# Patient Record
Sex: FEMALE | Race: WHITE | NOT HISPANIC OR LATINO | Employment: OTHER | URBAN - METROPOLITAN AREA
[De-identification: names, ages, dates, MRNs, and addresses within clinical notes are randomized per-mention and may not be internally consistent; named-entity substitution may affect disease eponyms.]

---

## 2017-01-26 RX ORDER — DUREZOL 0.5 MG/ML
EMULSION OPHTHALMIC
Qty: 5 ML | Refills: 3 | Status: SHIPPED | OUTPATIENT
Start: 2017-01-26 | End: 2017-09-20 | Stop reason: SDUPTHER

## 2017-04-19 ENCOUNTER — OFFICE VISIT (OUTPATIENT)
Dept: OPHTHALMOLOGY | Facility: CLINIC | Age: 82
End: 2017-04-19
Payer: MEDICARE

## 2017-04-19 DIAGNOSIS — Z94.7 STATUS POST CORNEAL TRANSPLANT: Primary | ICD-10-CM

## 2017-04-19 DIAGNOSIS — T86.8419 CORNEAL TRANSPLANT FAILURE: ICD-10-CM

## 2017-04-19 PROCEDURE — 92014 COMPRE OPH EXAM EST PT 1/>: CPT | Mod: S$PBB,,, | Performed by: OPHTHALMOLOGY

## 2017-04-19 PROCEDURE — 99213 OFFICE O/P EST LOW 20 MIN: CPT | Mod: PBBFAC | Performed by: OPHTHALMOLOGY

## 2017-04-19 PROCEDURE — 99999 PR PBB SHADOW E&M-EST. PATIENT-LVL III: CPT | Mod: PBBFAC,,, | Performed by: OPHTHALMOLOGY

## 2017-04-19 RX ORDER — LANOLIN ALCOHOL/MO/W.PET/CERES
1 CREAM (GRAM) TOPICAL DAILY
Refills: 3 | COMMUNITY
Start: 2017-02-14 | End: 2018-08-16

## 2017-04-19 RX ORDER — CLOBETASOL PROPIONATE 0.5 MG/G
CREAM TOPICAL
Refills: 2 | COMMUNITY
Start: 2017-03-02 | End: 2018-08-16 | Stop reason: SDUPTHER

## 2017-04-19 RX ORDER — CLOBETASOL PROPIONATE 0.5 MG/G
OINTMENT TOPICAL
Refills: 2 | COMMUNITY
Start: 2017-03-09

## 2017-04-19 RX ORDER — HALOBETASOL PROPIONATE 0.5 MG/G
CREAM TOPICAL
Refills: 1 | COMMUNITY
Start: 2017-04-04 | End: 2017-04-19

## 2017-04-19 RX ORDER — CLOPIDOGREL BISULFATE 75 MG/1
75 TABLET ORAL DAILY
Refills: 3 | COMMUNITY
Start: 2017-03-23

## 2017-04-19 RX ORDER — ATORVASTATIN CALCIUM 80 MG/1
80 TABLET, FILM COATED ORAL NIGHTLY
Refills: 3 | COMMUNITY
Start: 2017-03-23 | End: 2020-05-20 | Stop reason: SDUPTHER

## 2017-04-19 RX ORDER — SUCRALFATE 1 G/1
TABLET ORAL
Refills: 3 | COMMUNITY
Start: 2017-03-09 | End: 2018-08-16 | Stop reason: CLARIF

## 2017-04-19 RX ORDER — PANTOPRAZOLE SODIUM 40 MG/1
40 TABLET, DELAYED RELEASE ORAL DAILY
Refills: 3 | COMMUNITY
Start: 2017-02-23

## 2017-04-19 NOTE — PROGRESS NOTES
HPI     Eye Problem    Additional comments: S/p PKP           Comments   Hx of failing graft OS.   S/p PKP 03/10/04 by Dr Low.   S/p RK OU by Dr Tubbs.    Pt states vision fluctuates but seems to be good today.  Over the last two   weeks has had several episodes of white spot in central vision OS.  Takes   several hours to clear.  Also feels that the ALYSA is lower now.  States   family is noticing it too.        Durezol bid OS   Alway qhs OD          Last edited by Maribel August on 4/19/2017  1:12 PM. (History)            Assessment /Plan     For exam results, see Encounter Report.    Status post corneal transplant    Corneal transplant failure      RK OD with good vision.    PKP OS with chronic graft failure.  Vision from 20/60 to 20/200. No pain.  Considering repeat PKP OS, but monitor for now.

## 2017-04-19 NOTE — MR AVS SNAPSHOT
[facility]  [address]  [facility] [address]  Phone: [phone]  Fax: [phone]                  Jodie Alvarez   2017 1:15 PM   Office Visit    Description:  Female : 1933   Provider:  Jose Lerma MD   Department:  [facility]           Reason for Visit     Eye Problem           Diagnoses this Visit        Comments    Status post corneal transplant    -  Primary     Corneal transplant failure                To Do List           Goals (5 Years of Data)     None      [facility] On [facility]     [facility] On Call Nurse Care Line -  Assistance  Unless otherwise directed by your provider, please contact [facility], our nurse care line that is available for  assistance.     Registered nurses in the [facility] On Call Center provide: appointment scheduling, clinical advisement, health education, and other advisory services.  Call: [phone] (toll free)               Medications           Message regarding Medications     Verify the changes and/or additions to your medication regime listed below are the same as discussed with your clinician today.  If any of these changes or additions are incorrect, please notify your healthcare provider.        STOP taking these medications     butalbital-aspirin-caffeine -40 mg (FIORINAL) -40 mg Cap Take 1 capsule by mouth every 4 (four) hours as needed.    CIPROFLOXACIN/CIPROFLOXA HCL (CIPROFLOXACIN ER ORAL) Take by mouth.    halobetasol (ULTRAVATE) 0.05 % cream APPLY TO THE AFFECTED AREA(S) TWICE DAILY           Verify that the below list of medications is an accurate representation of the medications you are currently taking.  If none reported, the list may be blank. If incorrect, please contact your healthcare provider. Carry this list with you in case of emergency.           Current Medications     atorvastatin (LIPITOR) 80 MG tablet Take 80 mg by mouth nightly.    clobetasol (TEMOVATE) 0.05 % cream APPLY  TO THE AFFECTED AREA(S) LEGS TWICE DAILY wear knee high support hose    clobetasol 0.05% (TEMOVATE) 0.05 % Oint APPLY TO THE AFFECTED AREA(S) LEGS TWICE DAILY wear knee high support hose    clopidogrel (PLAVIX) 75 mg tablet Take 75 mg by mouth once daily.    DUREZOL 0.05 % Drop ophthalmic solution PLACE TWO DROPS INTO LEFT EYE TWICE DAILY    fenofibrate 160 MG Tab Take 160 mg by mouth once daily.    magnesium oxide (MAG-OX) 400 mg tablet Take 1 tablet by mouth once daily.    metoprolol tartrate (LOPRESSOR) 50 MG tablet Take 50 mg by mouth 2 (two) times daily.    moexipril-hydrochlorothiazide (UNIRETIC) 7.5-12.5 mg per tablet Take 1 tablet by mouth once daily.    pantoprazole (PROTONIX) 40 MG tablet Take 40 mg by mouth once daily.    tramadol (ULTRAM) 50 mg tablet Take 50 mg by mouth every 6 (six) hours as needed for Pain.    UNABLE TO FIND Tranzene    sucralfate (CARAFATE) 1 gram tablet Take 1 tablet by mouth four times a day TAKE 1 TABLET BY MOUTH FOUR TIMES A DAY TAKE BEFORE MEALS AND AT BEDTIME           Clinical Reference Information           Allergies as of 4/19/2017     No Known Allergies      Immunizations Administered on Date of Encounter - 4/19/2017     None      [facility] Sign-Up     Activating your [facility] account is as easy as 1-2-3!     1) Visit [email or web address], select Sign Up Now, enter this activation code and your date of birth, then select Next.  [email or web address]  Expires: 6/3/2017  2:02 PM      2) Create a username and password to use when you visit [facility] in the future and select a security question in case you lose your password and select Next.    3) Enter your e-mail address and click Sign Up!    Additional Information  If you have questions, please e-mail [email or web address] or call [phone] to talk to our [facility] staff. Remember, [facility] is NOT to be used for urgent needs. For medical emergencies, dial 911.         Language Assistance Services     ATTENTION: Language  assistance services are available, free of charge. Please call [phone].      ATENCIÓN: Si habla bharat, tiene a mckeon disposición servicios gratuitos de asistencia lingüística. Llame al [phone].     CHÚ Ý: N?u b?n nói Ti?ng Vi?t, có các d?ch v? h? tr? ngôn ng? mi?n phí dành cho b?n. G?i s? [phone].         [facility] complies with applicable Federal civil rights laws and does not discriminate on the basis of race, color, national origin, age, disability, or sex.

## 2017-09-27 RX ORDER — DUREZOL 0.5 MG/ML
EMULSION OPHTHALMIC
Qty: 5 ML | Refills: 3 | Status: SHIPPED | OUTPATIENT
Start: 2017-09-27 | End: 2019-01-09 | Stop reason: SDUPTHER

## 2018-04-25 ENCOUNTER — OFFICE VISIT (OUTPATIENT)
Dept: OPHTHALMOLOGY | Facility: CLINIC | Age: 83
End: 2018-04-25
Payer: MEDICARE

## 2018-04-25 DIAGNOSIS — T86.8419 CORNEAL TRANSPLANT FAILURE: Primary | ICD-10-CM

## 2018-04-25 DIAGNOSIS — Z98.890 HISTORY OF RADIAL KERATOTOMY: ICD-10-CM

## 2018-04-25 PROCEDURE — 99212 OFFICE O/P EST SF 10 MIN: CPT | Mod: PBBFAC | Performed by: OPHTHALMOLOGY

## 2018-04-25 PROCEDURE — 99999 PR PBB SHADOW E&M-EST. PATIENT-LVL II: CPT | Mod: PBBFAC,,, | Performed by: OPHTHALMOLOGY

## 2018-04-25 PROCEDURE — 92014 COMPRE OPH EXAM EST PT 1/>: CPT | Mod: S$PBB,,, | Performed by: OPHTHALMOLOGY

## 2018-04-25 RX ORDER — NITROGLYCERIN 20 MG/1
PATCH TRANSDERMAL
Refills: 3 | COMMUNITY
Start: 2018-02-05 | End: 2019-12-18 | Stop reason: DRUGHIGH

## 2018-04-25 RX ORDER — POTASSIUM CHLORIDE 750 MG/1
10 CAPSULE, EXTENDED RELEASE ORAL DAILY
Refills: 0 | COMMUNITY
Start: 2018-02-15

## 2018-04-25 RX ORDER — PREDNISOLONE ACETATE 10 MG/ML
1 SUSPENSION/ DROPS OPHTHALMIC 2 TIMES DAILY
Qty: 10 ML | Refills: 6 | Status: SHIPPED | OUTPATIENT
Start: 2018-04-25 | End: 2019-11-07

## 2018-04-25 NOTE — PROGRESS NOTES
HPI     Follow-up    Additional comments: 1 YEAR FU            Comments   H/O of failing graft OS.   S/P PKP 03/10/04 by Dr Low.   S/P RK OU by Dr Tubbs.    Pt states she is doing well today, she states her vision has been   fluctuating a lot within the past few weeks and was wondering if it could   be due to blood pressure.   Durezol BID OS   Alway QHS OD        Last edited by Yin Boyle, PCT on 4/25/2018  1:20 PM. (History)            Assessment /Plan     For exam results, see Encounter Report.    Corneal transplant failure    History of radial keratotomy      PKP failure with edema OS, stable without complications.  OD RK with good vision.

## 2018-08-20 PROBLEM — S42.301K: Status: ACTIVE | Noted: 2018-08-20

## 2018-08-21 PROBLEM — E87.6 HYPOKALEMIA: Status: ACTIVE | Noted: 2018-08-21

## 2018-08-21 PROBLEM — I10 ESSENTIAL HYPERTENSION: Status: ACTIVE | Noted: 2018-08-21

## 2018-08-21 PROBLEM — S42.301A FRACTURE OF HUMERAL SHAFT, RIGHT, CLOSED: Status: ACTIVE | Noted: 2018-08-21

## 2018-08-21 PROBLEM — I25.10 CAD (CORONARY ARTERY DISEASE): Status: ACTIVE | Noted: 2018-08-21

## 2019-02-28 RX ORDER — DUREZOL 0.5 MG/ML
EMULSION OPHTHALMIC
Qty: 5 ML | Refills: 3 | Status: SHIPPED | OUTPATIENT
Start: 2019-02-28 | End: 2020-01-24

## 2019-03-06 ENCOUNTER — TELEPHONE (OUTPATIENT)
Dept: OPHTHALMOLOGY | Facility: CLINIC | Age: 84
End: 2019-03-06

## 2019-03-06 NOTE — TELEPHONE ENCOUNTER
----- Message from Isabel Jim sent at 3/6/2019 10:21 AM CST -----  Contact: Jodie Mendez calling to find out why she is not to be scheduled with Dr. Lerma.  The recall had Dr. Lynn on it and she does not know why.  If she has to just come in for an exam she states she can have that done elsewhere with her own eye doctor.  Please confirm with patient and if we need to cancel the appt please do so.  She can be reached at [phone]

## 2019-03-06 NOTE — TELEPHONE ENCOUNTER
Patient states she wants to cx her appt with Dr. Lynn. She will see Dr. Patel, who is the doctor that referred her to Dr. Lerma.

## 2019-05-22 ENCOUNTER — OFFICE VISIT (OUTPATIENT)
Dept: CARDIOLOGY | Facility: CLINIC | Age: 84
End: 2019-05-22
Payer: MEDICARE

## 2019-05-22 VITALS
HEIGHT: 66 IN | HEART RATE: 58 BPM | BODY MASS INDEX: 23.1 KG/M2 | OXYGEN SATURATION: 92 % | DIASTOLIC BLOOD PRESSURE: 82 MMHG | WEIGHT: 143.75 LBS | SYSTOLIC BLOOD PRESSURE: 128 MMHG

## 2019-05-22 DIAGNOSIS — Z79.899 LONG TERM CURRENT USE OF AMIODARONE: ICD-10-CM

## 2019-05-22 DIAGNOSIS — E78.5 DYSLIPIDEMIA: ICD-10-CM

## 2019-05-22 DIAGNOSIS — Z79.02 LONG TERM (CURRENT) USE OF ANTITHROMBOTICS/ANTIPLATELETS: ICD-10-CM

## 2019-05-22 DIAGNOSIS — R01.1 UNDIAGNOSED CARDIAC MURMURS: ICD-10-CM

## 2019-05-22 DIAGNOSIS — I10 ESSENTIAL HYPERTENSION: ICD-10-CM

## 2019-05-22 DIAGNOSIS — I73.9 ASYMPTOMATIC PVD (PERIPHERAL VASCULAR DISEASE): ICD-10-CM

## 2019-05-22 DIAGNOSIS — I25.709 CORONARY ARTERY DISEASE INVOLVING CORONARY BYPASS GRAFT OF NATIVE HEART WITH ANGINA PECTORIS: Primary | ICD-10-CM

## 2019-05-22 DIAGNOSIS — N18.30 STAGE 3 CHRONIC KIDNEY DISEASE: ICD-10-CM

## 2019-05-22 DIAGNOSIS — R09.89 BILATERAL CAROTID BRUITS: ICD-10-CM

## 2019-05-22 PROCEDURE — 99204 OFFICE O/P NEW MOD 45 MIN: CPT | Mod: S$GLB,,, | Performed by: INTERNAL MEDICINE

## 2019-05-22 PROCEDURE — 93000 ELECTROCARDIOGRAM COMPLETE: CPT | Mod: S$GLB,,, | Performed by: INTERNAL MEDICINE

## 2019-05-22 PROCEDURE — 93000 EKG 12-LEAD: ICD-10-PCS | Mod: S$GLB,,, | Performed by: INTERNAL MEDICINE

## 2019-05-22 PROCEDURE — 99204 PR OFFICE/OUTPT VISIT, NEW, LEVL IV, 45-59 MIN: ICD-10-PCS | Mod: S$GLB,,, | Performed by: INTERNAL MEDICINE

## 2019-05-22 RX ORDER — AMIODARONE HYDROCHLORIDE 100 MG/1
100 TABLET ORAL DAILY
Qty: 30 TABLET | Refills: 1 | Status: SHIPPED | OUTPATIENT
Start: 2019-05-22 | End: 2019-07-05 | Stop reason: SDUPTHER

## 2019-05-22 RX ORDER — RANOLAZINE 1000 MG/1
1000 TABLET, EXTENDED RELEASE ORAL 2 TIMES DAILY
Refills: 2 | COMMUNITY
Start: 2019-04-30 | End: 2019-07-18 | Stop reason: SDUPTHER

## 2019-05-22 RX ORDER — AMIODARONE HYDROCHLORIDE 200 MG/1
200 TABLET ORAL DAILY
Refills: 2 | COMMUNITY
Start: 2019-04-30 | End: 2019-05-22 | Stop reason: DRUGHIGH

## 2019-05-22 RX ORDER — NITROGLYCERIN 0.4 MG/1
0.4 TABLET SUBLINGUAL EVERY 5 MIN PRN
Qty: 25 TABLET | Refills: 0 | Status: SHIPPED | OUTPATIENT
Start: 2019-05-22 | End: 2020-05-21

## 2019-05-22 NOTE — PROGRESS NOTES
Subjective:    Patient ID:  Jodie Alvarez is a 86 y.o. female who presents for Coronary Artery Disease; Hypertension; and Hyperlipidemia        Coronary Artery Disease   Presents for initial visit. The disease course has been fluctuating. Pertinent negatives include no chest pain (SOME TIGHTNESS, ), dizziness (OCC), leg swelling, palpitations (LESS) or shortness of breath (SOME). Risk factors include hyperlipidemia. Past treatments include antiplatelet agents, beta blockers and nitrates. The treatment provided moderate relief. Compliance with prior treatments has been good. Her past medical history is significant for angina pectoris, arrhythmia and valvular heart disease. Past surgical history includes angioplasty, CABG and cardiac stents.   Hypertension   This is a chronic problem. The current episode started more than 1 year ago. The problem is controlled. Pertinent negatives include no blurred vision, chest pain (SOME TIGHTNESS, ), malaise/fatigue (SOME), orthopnea, palpitations (LESS), PND or shortness of breath (SOME). Past treatments include beta blockers. The current treatment provides moderate improvement. Compliance problems include exercise.  There is no history of CVA or heart failure.   Hyperlipidemia   This is a chronic problem. The current episode started more than 1 year ago. Pertinent negatives include no chest pain (SOME TIGHTNESS, ), focal weakness or shortness of breath (SOME). Current antihyperlipidemic treatment includes statins. Risk factors for coronary artery disease include dyslipidemia and hypertension.     NEW PATIENT EVALUATION, WAS FOLLOWED BY DR FLORES, , HAD PCI COMPLICATED BY PERFORATION,ON 4/26/2019 AT [facility],WAS TO HAVE CABG, THEN MEDICAL TX, AS SEEN BY DR CURTIS, ALSO SEEN BY DR SHEIKH,  ,  HAS NOT BEEN WELL SINCE, , HAD R CEA PER DR CURTIS 1/28/2019 AT [facility],ON AMIODARONE  FOR ?? PAF, ALSO ON RANEXA, HAD CKD,RECENT CR 1.51. GFR 33 ,HAD ILIAC STENT ?/. DR SEGUNDO,  SEE ROS ,     Past  Medical History:   Diagnosis Date    Anticoagulant long-term use     Arthritis     Carotid artery occlusion     Cataract     Coronary artery disease     Hyperlipidemia     Hypertension      Past Surgical History:   Procedure Laterality Date    ANGIOPLASTY      DR SEGUNDO    APPENDECTOMY  1949    CARDIAC CATHETERIZATION      Carotid Surgery Left 1982    CATARACT EXTRACTION      COLONOSCOPY      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    Dr Low    CORONARY ARTERY BYPASS GRAFT  05/01/2006    HYSTERECTOMY  1972    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    ORIF, FRACTURE, HUMERUS - NONUNION Right 8/21/2018    Performed by [facility]wan [facility] at [facility]    [facility]  08/14/2008    3    RK/AK Bilateral 1990    Dr Tubbs    stent aearea  04/26/2016    TONSILLECTOMY  1939    VASCULAR SURGERY       Family History   Problem Relation Age of Onset    Amblyopia Neg Hx     Blindness Neg Hx     Cataracts Neg Hx     Glaucoma Neg Hx     Macular degeneration Neg Hx     Retinal detachment Neg Hx     Strabismus Neg Hx      Social History     Socioeconomic History    Marital status:      Spouse name: Not on file    Number of children: Not on file    Years of education: Not on file    Highest education level: Not on file   Occupational History    Not on file   Social Needs    Financial resource strain: Not on file    Food insecurity:     Worry: Not on file     Inability: Not on file    Transportation needs:     Medical: Not on file     Non-medical: Not on file   Tobacco Use    Smoking status: Never Smoker    Smokeless tobacco: Never Used   Substance and Sexual Activity    Alcohol use: No    Drug use: Never    Sexual activity: Not on file   Lifestyle    Physical activity:     Days per week: Not on file     Minutes per session: Not on file    Stress: Not on file   Relationships    Social connections:     Talks on phone: Not on file     Gets together: Not on file     Attends Temple service: Not on file      Active member of club or organization: Not on file     Attends meetings of clubs or organizations: Not on file     Relationship status: Not on file   Other Topics Concern    Not on file   Social History Narrative    Not on file       Review of patient's allergies indicates:  No Known Allergies    Current Outpatient Medications:     aspirin 325 MG tablet, Take 325 mg by mouth once daily. , Disp: , Rfl:     atorvastatin (LIPITOR) 80 MG tablet, Take 80 mg by mouth nightly., Disp: , Rfl: 3    clopidogrel (PLAVIX) 75 mg tablet, Take 75 mg by mouth once daily., Disp: , Rfl: 3    clorazepate (TRANXENE) 7.5 MG Tab, Take 7.5 mg by mouth 3 (three) times daily., Disp: , Rfl:     coenzyme Q10 (CO Q-10) 100 mg capsule, Take 100 mg by mouth every evening., Disp: , Rfl:     DUREZOL 0.05 % Drop ophthalmic solution, PLACE TWO DROPS INTO LEFT EYE TWICE DAILY, Disp: 5 mL, Rfl: 3    fenofibrate 160 MG Tab, Take 160 mg by mouth once daily., Disp: , Rfl:     metoprolol tartrate (LOPRESSOR) 100 MG tablet, Take 100 mg by mouth 2 (two) times daily. , Disp: , Rfl:     nitroGLYCERIN 0.1 mg/hr TD PT24 (NITRODUR) 0.1 mg/hr PT24, apply ONE PATCH TO SKIN ONCE a DAY AS DIRECTED, Disp: , Rfl: 3    omega-3 fatty acids-fish oil 300-500 mg Cap, Take 1 capsule by mouth once daily., Disp: , Rfl:     oxyCODONE-acetaminophen (PERCOCET)  mg per tablet, Take 1 tablet by mouth every 4 (four) hours as needed for Pain., Disp: 45 tablet, Rfl: 0    pantoprazole (PROTONIX) 40 MG tablet, Take 40 mg by mouth once daily. , Disp: , Rfl: 3    potassium chloride (MICRO-K) 10 MEQ CpSR, Take 10 mEq by mouth once daily., Disp: , Rfl: 0    ranolazine (RANEXA) 1,000 mg Tb12, Take 1,000 mg by mouth 2 (two) times daily., Disp: , Rfl: 2    tramadol (ULTRAM) 50 mg tablet, Take 50 mg by mouth every 8 (eight) hours as needed for Pain. , Disp: , Rfl:     amiodarone (PACERONE) 100 MG Tab, Take 1 tablet (100 mg total) by mouth once daily., Disp: 30  tablet, Rfl: 1    clobetasol 0.05% (TEMOVATE) 0.05 % Oint, APPLY TO THE AFFECTED AREA(S) LEGS TWICE DAILY wear knee high support hose, Disp: , Rfl: 2    nitroGLYCERIN (NITROSTAT) 0.4 MG SL tablet, Place 1 tablet (0.4 mg total) under the tongue every 5 (five) minutes as needed., Disp: 25 tablet, Rfl: 0    prednisoLONE acetate (PRED FORTE) 1 % DrpS, Place 1 drop into the left eye 2 (two) times daily., Disp: 10 mL, Rfl: 6  No current facility-administered medications for this visit.     Facility-Administered Medications Ordered in Other Visits:     sodium chloride 0.9% flush 3 mL, 3 mL, Intravenous, PRN, Donato Awan MD    Review of Systems   Constitution: Negative for chills, diaphoresis, fever, malaise/fatigue (SOME) and night sweats.   HENT: Negative for congestion, hearing loss and nosebleeds.    Eyes: Negative for blurred vision and visual disturbance.   Cardiovascular: Positive for dyspnea on exertion. Negative for chest pain (SOME TIGHTNESS, ), claudication, cyanosis, irregular heartbeat, leg swelling, near-syncope, orthopnea, palpitations (LESS), paroxysmal nocturnal dyspnea and syncope.   Respiratory: Negative for cough, hemoptysis, shortness of breath (SOME) and wheezing.    Endocrine: Negative for cold intolerance, heat intolerance and polyuria.   Hematologic/Lymphatic: Negative for adenopathy. Does not bruise/bleed easily.   Skin: Negative for color change, itching and rash.   Musculoskeletal: Negative for back pain (SOME), falls and stiffness.   Gastrointestinal: Negative for abdominal pain, change in bowel habit, dysphagia, heartburn, hematemesis, jaundice, melena and vomiting.   Genitourinary: Negative for dysuria, flank pain and frequency. Bladder incontinence: SOME.   Neurological: Negative for brief paralysis, dizziness (OCC), focal weakness, light-headedness, loss of balance, numbness, paresthesias, sensory change, tremors and weakness.   Psychiatric/Behavioral: Negative for altered mental status,  "depression and memory loss. The patient is not nervous/anxious.    Allergic/Immunologic: Negative for hives and persistent infections.        Objective:      Vitals:    05/22/19 1123   BP: 128/82   Pulse: (!) 58   SpO2: (!) 92%   Weight: 65.2 kg (143 lb 11.8 oz)   Height: 5' 6" (1.676 m)   PainSc: 0-No pain     Body mass index is 23.2 kg/m².    Physical Exam   Constitutional: She is oriented to person, place, and time. She appears well-developed and well-nourished. She is active.   HENT:   Head: Normocephalic and atraumatic.   Mouth/Throat: Oropharynx is clear and moist and mucous membranes are normal.   Eyes: Pupils are equal, round, and reactive to light. Conjunctivae and EOM are normal.   Neck: Trachea normal and normal range of motion. Neck supple. Normal carotid pulses, no hepatojugular reflux and no JVD present. Carotid bruit is present. No tracheal deviation, no edema and no erythema present. No thyromegaly present.       R CEA SACR   Cardiovascular: Normal rate and regular rhythm.  No extrasystoles are present. PMI is not displaced. Exam reveals no gallop and no friction rub.   Murmur heard.  High-pitched blowing systolic murmur is present with a grade of 2/6 at the apex.  Pulses:       Carotid pulses are 2+ on the right side with bruit, and 2+ on the left side with bruit.       Radial pulses are 2+ on the right side, and 2+ on the left side.        Femoral pulses are 2+ on the right side with bruit, and 2+ on the left side with bruit.       Dorsalis pedis pulses are 2+ on the right side, and 2+ on the left side.        Posterior tibial pulses are 2+ on the right side, and 2+ on the left side.   Pulmonary/Chest: Effort normal and breath sounds normal. No tachypnea and no bradypnea. She has no wheezes. She has no rales. She exhibits no tenderness.   STERNOTOMY SCAR   Abdominal: Soft. Bowel sounds are normal. She exhibits no distension and no mass. There is no hepatosplenomegaly. There is no tenderness. There " is no guarding and no CVA tenderness.   Musculoskeletal: Normal range of motion. She exhibits no edema or tenderness.   FELICITY R ARM   Lymphadenopathy:     She has no cervical adenopathy.   Neurological: She is alert and oriented to person, place, and time. She has normal strength. She displays no tremor. No cranial nerve deficit. She exhibits normal muscle tone. Coordination normal.   Skin: Skin is warm and dry. No rash noted. No cyanosis or erythema. No pallor.   Psychiatric: She has a normal mood and affect. Her speech is normal and behavior is normal. Judgment and thought content normal.               ..    Chemistry        Component Value Date/Time     08/22/2018 0930    K 3.5 08/22/2018 0930     08/22/2018 0930    CO2 29 08/22/2018 0930    BUN 16 08/22/2018 0930    CREATININE 0.91 08/22/2018 0930     (H) 08/22/2018 0930        Component Value Date/Time    CALCIUM 8.3 (L) 08/22/2018 0930    ESTGFRAFRICA >60 08/22/2018 0930    EGFRNONAA 58 (A) 08/22/2018 0930            ..No results found for: CHOL  No results found for: HDL  No results found for: LDLCALC  No results found for: TRIG  No results found for: CHOLHDL  ..  Lab Results   Component Value Date    WBC 6.05 08/22/2018    HGB 10.3 (L) 08/22/2018    HCT 32.2 (L) 08/22/2018    MCV 91 08/22/2018     08/22/2018       Test(s) Reviewed  I have reviewed the following in detail:  [] Stress test   [] Angiography   [] Echocardiogram   [] Labs   [] Other:       Assessment:         ICD-10-CM ICD-9-CM   1. Coronary artery disease involving coronary bypass graft of native heart with angina pectoris I25.709 414.05     413.9   2. Long term current use of amiodarone Z79.899 V58.69   3. Bilateral carotid bruits R09.89 785.9   4. Essential hypertension I10 401.9   5. Dyslipidemia E78.5 272.4   6. Stage 3 chronic kidney disease N18.3 585.3   7. Asymptomatic PVD (peripheral vascular disease) I73.9 443.9   8. Long term (current) use of  antithrombotics/antiplatelets Z79.02 V58.63   9. Undiagnosed cardiac murmurs R01.1 785.2     Problem List Items Addressed This Visit        Cardiac/Vascular    CAD (coronary artery disease) - Primary    Relevant Orders    SCHEDULED EKG 12-LEAD (to Muse) (Completed)    Comprehensive metabolic panel    Transthoracic echo (TTE) 2D with Color Flow    Essential hypertension    Relevant Orders    Comprehensive metabolic panel    Long term current use of amiodarone    Relevant Orders    Comprehensive metabolic panel    TSH    T3    T4    Dyslipidemia    Relevant Orders    Comprehensive metabolic panel    Lipid panel    Bilateral carotid bruits    Relevant Orders    Comprehensive metabolic panel    CV Ultrasound Bilateral Doppler Carotid    Asymptomatic PVD (peripheral vascular disease)    Relevant Orders    Comprehensive metabolic panel       Renal/    Stage 3 chronic kidney disease    Relevant Orders    Comprehensive metabolic panel       Hematology    Long term (current) use of antithrombotics/antiplatelets    Relevant Orders    Comprehensive metabolic panel    Hemoglobin      Other Visit Diagnoses     Undiagnosed cardiac murmurs        Relevant Orders    Transthoracic echo (TTE) 2D with Color Flow           Plan:     EKG SB , FIRST DEGREE AVB, , WILL GET RECORDS FROM [facility], DECREASE AMIODARONE  MG, QUESTIONABLY HAD PAROXYSMAL VT, CGECK ECHO, CAROTIDS, LABS,ALL OTHER CV CLINICALLY STABLE, CLASS 1-2 ANGINA, NO HF, NO TIA, NO CLINICAL ARRHYTHMIA,CONTINUE CURRENT MEDS, EDUCATION, DIET, EXERCISE , EXPLAINED PLAN TO PATIENT AND HER FRIEND, DID SEE THE PATIENT BACK IN FEW WEEKS ALSO TO OBTAIN CD OF THE ACTUAL ANGIOGRAM TO REVIEW      Coronary artery disease involving coronary bypass graft of native heart with angina pectoris  -     SCHEDULED EKG 12-LEAD (to Muse); Future  -     Comprehensive metabolic panel; Future; Expected date: 05/22/2019  -     Transthoracic echo (TTE) 2D with Color Flow; Future    Long term current  use of amiodarone  -     Comprehensive metabolic panel; Future; Expected date: 05/22/2019  -     TSH; Future; Expected date: 05/22/2019  -     T3; Future; Expected date: 05/22/2019  -     T4; Future; Expected date: 05/22/2019    Bilateral carotid bruits  -     Comprehensive metabolic panel; Future; Expected date: 05/22/2019  -     CV Ultrasound Bilateral Doppler Carotid; Future    Essential hypertension  -     Comprehensive metabolic panel; Future; Expected date: 05/22/2019    Dyslipidemia  -     Comprehensive metabolic panel; Future; Expected date: 05/22/2019  -     Lipid panel; Future; Expected date: 05/22/2019    Stage 3 chronic kidney disease  -     Comprehensive metabolic panel; Future; Expected date: 05/22/2019    Asymptomatic PVD (peripheral vascular disease)  -     Comprehensive metabolic panel; Future; Expected date: 05/22/2019    Long term (current) use of antithrombotics/antiplatelets  -     Comprehensive metabolic panel; Future; Expected date: 05/22/2019  -     Hemoglobin; Future; Expected date: 05/22/2019    Undiagnosed cardiac murmurs  -     Transthoracic echo (TTE) 2D with Color Flow; Future    Other orders  -     amiodarone (PACERONE) 100 MG Tab; Take 1 tablet (100 mg total) by mouth once daily.  Dispense: 30 tablet; Refill: 1  -     nitroGLYCERIN (NITROSTAT) 0.4 MG SL tablet; Place 1 tablet (0.4 mg total) under the tongue every 5 (five) minutes as needed.  Dispense: 25 tablet; Refill: 0    RTC Low level/low impact aerobic exercise 5x's/wk. Heart healthy diet and risk factor modification.    See labs and med orders.    Aerobic exercise 5x's/wk. Heart healthy diet and risk factor modification.    See labs and med orders.

## 2019-06-06 ENCOUNTER — CLINICAL SUPPORT (OUTPATIENT)
Dept: CARDIOLOGY | Facility: CLINIC | Age: 84
End: 2019-06-06
Attending: INTERNAL MEDICINE
Payer: MEDICARE

## 2019-06-06 ENCOUNTER — LAB VISIT (OUTPATIENT)
Dept: LAB | Facility: HOSPITAL | Age: 84
End: 2019-06-06
Attending: INTERNAL MEDICINE
Payer: MEDICARE

## 2019-06-06 VITALS
BODY MASS INDEX: 22.98 KG/M2 | SYSTOLIC BLOOD PRESSURE: 128 MMHG | WEIGHT: 143 LBS | HEART RATE: 58 BPM | HEIGHT: 66 IN | DIASTOLIC BLOOD PRESSURE: 82 MMHG

## 2019-06-06 DIAGNOSIS — N18.30 STAGE 3 CHRONIC KIDNEY DISEASE: ICD-10-CM

## 2019-06-06 DIAGNOSIS — Z79.899 LONG TERM CURRENT USE OF AMIODARONE: ICD-10-CM

## 2019-06-06 DIAGNOSIS — I10 ESSENTIAL HYPERTENSION: ICD-10-CM

## 2019-06-06 DIAGNOSIS — R01.1 UNDIAGNOSED CARDIAC MURMURS: ICD-10-CM

## 2019-06-06 DIAGNOSIS — R09.89 BILATERAL CAROTID BRUITS: ICD-10-CM

## 2019-06-06 DIAGNOSIS — I73.9 ASYMPTOMATIC PVD (PERIPHERAL VASCULAR DISEASE): ICD-10-CM

## 2019-06-06 DIAGNOSIS — Z79.02 LONG TERM (CURRENT) USE OF ANTITHROMBOTICS/ANTIPLATELETS: ICD-10-CM

## 2019-06-06 DIAGNOSIS — I25.709 CORONARY ARTERY DISEASE INVOLVING CORONARY BYPASS GRAFT OF NATIVE HEART WITH ANGINA PECTORIS: ICD-10-CM

## 2019-06-06 DIAGNOSIS — E78.5 DYSLIPIDEMIA: ICD-10-CM

## 2019-06-06 LAB
ALBUMIN SERPL BCP-MCNC: 3.2 G/DL (ref 3.5–5.2)
ALP SERPL-CCNC: 71 U/L (ref 55–135)
ALT SERPL W/O P-5'-P-CCNC: 19 U/L (ref 10–44)
ANION GAP SERPL CALC-SCNC: 6 MMOL/L (ref 8–16)
ASCENDING AORTA: 3.22 CM
AST SERPL-CCNC: 31 U/L (ref 10–40)
AV INDEX (PROSTH): 0.68
AV MEAN GRADIENT: 5.9 MMHG
AV PEAK GRADIENT: 12.39 MMHG
AV VALVE AREA: 2.33 CM2
AV VELOCITY RATIO: 0.54
BILIRUB SERPL-MCNC: 0.4 MG/DL (ref 0.1–1)
BSA FOR ECHO PROCEDURE: 1.74 M2
BUN SERPL-MCNC: 19 MG/DL (ref 8–23)
CALCIUM SERPL-MCNC: 9.8 MG/DL (ref 8.7–10.5)
CHLORIDE SERPL-SCNC: 109 MMOL/L (ref 95–110)
CHOLEST SERPL-MCNC: 121 MG/DL (ref 120–199)
CHOLEST/HDLC SERPL: 2.4 {RATIO} (ref 2–5)
CO2 SERPL-SCNC: 29 MMOL/L (ref 23–29)
CREAT SERPL-MCNC: 1.5 MG/DL (ref 0.5–1.4)
CV ECHO LV RWT: 0.39 CM
DOP CALC AO PEAK VEL: 1.76 M/S
DOP CALC AO VTI: 37.89 CM
DOP CALC LVOT AREA: 3.43 CM2
DOP CALC LVOT DIAMETER: 2.09 CM
DOP CALC LVOT PEAK VEL: 0.95 M/S
DOP CALC LVOT STROKE VOLUME: 88.12 CM3
DOP CALCLVOT PEAK VEL VTI: 25.7 CM
E WAVE DECELERATION TIME: 179.9 MSEC
E/A RATIO: 2.44
E/E' RATIO: 20.62
ECHO LV POSTERIOR WALL: 0.87 CM (ref 0.6–1.1)
EST. GFR  (AFRICAN AMERICAN): 36.1 ML/MIN/1.73 M^2
EST. GFR  (NON AFRICAN AMERICAN): 31.3 ML/MIN/1.73 M^2
FRACTIONAL SHORTENING: 42 % (ref 28–44)
GLUCOSE SERPL-MCNC: 88 MG/DL (ref 70–110)
HDLC SERPL-MCNC: 50 MG/DL (ref 40–75)
HDLC SERPL: 41.3 % (ref 20–50)
HGB BLD-MCNC: 10.6 G/DL (ref 12–16)
INTERVENTRICULAR SEPTUM: 0.86 CM (ref 0.6–1.1)
LA MAJOR: 5.79 CM
LA MINOR: 4.34 CM
LA WIDTH: 3.66 CM
LDLC SERPL CALC-MCNC: 49.4 MG/DL (ref 63–159)
LEFT ARM DIASTOLIC BLOOD PRESSURE: 82 MMHG
LEFT ARM SYSTOLIC BLOOD PRESSURE: 128 MMHG
LEFT ATRIUM SIZE: 3.96 CM
LEFT ATRIUM VOLUME INDEX: 35.2 ML/M2
LEFT ATRIUM VOLUME: 61.12 CM3
LEFT CBA DIAS: 15 CM/S
LEFT CBA SYS: 87 CM/S
LEFT CCA DIST DIAS: 15 CM/S
LEFT CCA DIST SYS: 87 CM/S
LEFT CCA MID DIAS: 13 CM/S
LEFT CCA MID SYS: 74 CM/S
LEFT CCA PROX DIAS: 13 CM/S
LEFT CCA PROX SYS: 77 CM/S
LEFT ECA DIAS: 15 CM/S
LEFT ECA SYS: 79 CM/S
LEFT ICA DIST DIAS: 31 CM/S
LEFT ICA DIST SYS: 112 CM/S
LEFT ICA MID DIAS: 24 CM/S
LEFT ICA MID SYS: 94 CM/S
LEFT ICA PROX DIAS: 22 CM/S
LEFT ICA PROX SYS: 101 CM/S
LEFT INTERNAL DIMENSION IN SYSTOLE: 2.55 CM (ref 2.1–4)
LEFT VENTRICLE DIASTOLIC VOLUME INDEX: 51.34 ML/M2
LEFT VENTRICLE DIASTOLIC VOLUME: 89.04 ML
LEFT VENTRICLE MASS INDEX: 70.8 G/M2
LEFT VENTRICLE SYSTOLIC VOLUME INDEX: 13.5 ML/M2
LEFT VENTRICLE SYSTOLIC VOLUME: 23.43 ML
LEFT VENTRICULAR INTERNAL DIMENSION IN DIASTOLE: 4.43 CM (ref 3.5–6)
LEFT VENTRICULAR MASS: 122.74 G
LEFT VERTEBRAL DIAS: 11 CM/S
LEFT VERTEBRAL SYS: 35 CM/S
LV LATERAL E/E' RATIO: 16.75
LV SEPTAL E/E' RATIO: 26.8
MV PEAK A VEL: 0.55 M/S
MV PEAK E VEL: 1.34 M/S
NONHDLC SERPL-MCNC: 71 MG/DL
OHS CV CAROTID RIGHT ICA EDV HIGHEST: 35
OHS CV CAROTID ULTRASOUND LEFT ICA/CCA RATIO: 1.29
OHS CV CAROTID ULTRASOUND RIGHT ICA/CCA RATIO: 1.36
OHS CV PV CAROTID LEFT HIGHEST CCA: 87
OHS CV PV CAROTID LEFT HIGHEST ICA: 112
OHS CV PV CAROTID RIGHT HIGHEST CCA: 138
OHS CV PV CAROTID RIGHT HIGHEST ICA: 187
OHS CV US CAROTID LEFT HIGHEST EDV: 31
PISA TR MAX VEL: 3.26 M/S
POTASSIUM SERPL-SCNC: 4.5 MMOL/L (ref 3.5–5.1)
PROT SERPL-MCNC: 6.1 G/DL (ref 6–8.4)
PULM VEIN S/D RATIO: 0.47
PV PEAK D VEL: 1.09 M/S
PV PEAK S VEL: 0.51 M/S
RA MAJOR: 4.11 CM
RA PRESSURE: 8 MMHG
RA WIDTH: 3.18 CM
RIGHT ARM DIASTOLIC BLOOD PRESSURE: 82 MMHG
RIGHT ARM SYSTOLIC BLOOD PRESSURE: 128 MMHG
RIGHT CBA DIAS: 32 CM/S
RIGHT CBA SYS: 187 CM/S
RIGHT CCA DIST DIAS: 28 CM/S
RIGHT CCA DIST SYS: 138 CM/S
RIGHT CCA MID DIAS: 11 CM/S
RIGHT CCA MID SYS: 50 CM/S
RIGHT CCA PROX DIAS: 8 CM/S
RIGHT CCA PROX SYS: 61 CM/S
RIGHT ECA DIAS: 27 CM/S
RIGHT ECA SYS: 148 CM/S
RIGHT ICA DIST DIAS: 22 CM/S
RIGHT ICA DIST SYS: 105 CM/S
RIGHT ICA MID DIAS: 15 CM/S
RIGHT ICA MID SYS: 94 CM/S
RIGHT ICA PROX DIAS: 35 CM/S
RIGHT ICA PROX SYS: 187 CM/S
RIGHT VENTRICULAR END-DIASTOLIC DIMENSION: 2.55 CM
RIGHT VERTEBRAL DIAS: 11 CM/S
RIGHT VERTEBRAL SYS: 55 CM/S
RV TISSUE DOPPLER FREE WALL SYSTOLIC VELOCITY 1 (APICAL 4 CHAMBER VIEW): 9.78 M/S
SINUS: 3.28 CM
SODIUM SERPL-SCNC: 144 MMOL/L (ref 136–145)
STJ: 3 CM
T3 SERPL-MCNC: 67 NG/DL (ref 60–180)
T4 SERPL-MCNC: 9.1 UG/DL (ref 4.5–11.5)
TDI LATERAL: 0.08
TDI SEPTAL: 0.05
TDI: 0.07
TR MAX PG: 42.51 MMHG
TRICUSPID ANNULAR PLANE SYSTOLIC EXCURSION: 2.05 CM
TRIGL SERPL-MCNC: 108 MG/DL (ref 30–150)
TSH SERPL DL<=0.005 MIU/L-ACNC: 3.06 UIU/ML (ref 0.4–4)
TV REST PULMONARY ARTERY PRESSURE: 51 MMHG

## 2019-06-06 PROCEDURE — 93306 TRANSTHORACIC ECHO (TTE) COMPLETE (CUPID ONLY): ICD-10-PCS | Mod: 26,S$PBB,, | Performed by: INTERNAL MEDICINE

## 2019-06-06 PROCEDURE — 80053 COMPREHEN METABOLIC PANEL: CPT

## 2019-06-06 PROCEDURE — 99212 OFFICE O/P EST SF 10 MIN: CPT | Mod: PBBFAC,25,PO

## 2019-06-06 PROCEDURE — 36415 COLL VENOUS BLD VENIPUNCTURE: CPT | Mod: PO

## 2019-06-06 PROCEDURE — 99999 PR PBB SHADOW E&M-EST. PATIENT-LVL II: CPT | Mod: PBBFAC,,,

## 2019-06-06 PROCEDURE — 80061 LIPID PANEL: CPT

## 2019-06-06 PROCEDURE — 99999 PR PBB SHADOW E&M-EST. PATIENT-LVL II: ICD-10-PCS | Mod: PBBFAC,,,

## 2019-06-06 PROCEDURE — 84436 ASSAY OF TOTAL THYROXINE: CPT

## 2019-06-06 PROCEDURE — 84480 ASSAY TRIIODOTHYRONINE (T3): CPT

## 2019-06-06 PROCEDURE — 93880 EXTRACRANIAL BILAT STUDY: CPT | Mod: PBBFAC,PO | Performed by: INTERNAL MEDICINE

## 2019-06-06 PROCEDURE — 93306 TTE W/DOPPLER COMPLETE: CPT | Mod: PBBFAC,PO | Performed by: INTERNAL MEDICINE

## 2019-06-06 PROCEDURE — 93880 CV US DOPPLER CAROTID (CUPID ONLY): ICD-10-PCS | Mod: 26,S$PBB,, | Performed by: INTERNAL MEDICINE

## 2019-06-06 PROCEDURE — 85018 HEMOGLOBIN: CPT

## 2019-06-06 PROCEDURE — 84443 ASSAY THYROID STIM HORMONE: CPT

## 2019-06-17 ENCOUNTER — TELEPHONE (OUTPATIENT)
Dept: CARDIOLOGY | Facility: CLINIC | Age: 84
End: 2019-06-17

## 2019-06-19 ENCOUNTER — OFFICE VISIT (OUTPATIENT)
Dept: CARDIOLOGY | Facility: CLINIC | Age: 84
End: 2019-06-19
Payer: MEDICARE

## 2019-06-19 ENCOUNTER — TELEPHONE (OUTPATIENT)
Dept: CARDIOLOGY | Facility: CLINIC | Age: 84
End: 2019-06-19

## 2019-06-19 VITALS
BODY MASS INDEX: 23.17 KG/M2 | HEART RATE: 60 BPM | OXYGEN SATURATION: 95 % | SYSTOLIC BLOOD PRESSURE: 110 MMHG | WEIGHT: 144.19 LBS | HEIGHT: 66 IN | DIASTOLIC BLOOD PRESSURE: 56 MMHG

## 2019-06-19 DIAGNOSIS — I65.21 STENOSIS OF RIGHT CAROTID ARTERY: ICD-10-CM

## 2019-06-19 DIAGNOSIS — I27.20 PULMONARY HYPERTENSION: ICD-10-CM

## 2019-06-19 DIAGNOSIS — I35.1 NONRHEUMATIC AORTIC VALVE INSUFFICIENCY: ICD-10-CM

## 2019-06-19 DIAGNOSIS — I34.0 NON-RHEUMATIC MITRAL REGURGITATION: ICD-10-CM

## 2019-06-19 DIAGNOSIS — R60.0 BILATERAL LEG EDEMA: Primary | ICD-10-CM

## 2019-06-19 PROCEDURE — 99214 OFFICE O/P EST MOD 30 MIN: CPT | Mod: S$GLB,,, | Performed by: INTERNAL MEDICINE

## 2019-06-19 PROCEDURE — 99214 PR OFFICE/OUTPT VISIT, EST, LEVL IV, 30-39 MIN: ICD-10-PCS | Mod: S$GLB,,, | Performed by: INTERNAL MEDICINE

## 2019-06-19 RX ORDER — BUMETANIDE 1 MG/1
1 TABLET ORAL DAILY
Qty: 30 TABLET | Refills: 11 | Status: SHIPPED | OUTPATIENT
Start: 2019-06-19 | End: 2020-06-18

## 2019-06-19 RX ORDER — ACETAMINOPHEN 500 MG
1 TABLET ORAL DAILY
COMMUNITY

## 2019-06-19 RX ORDER — HYDROCODONE BITARTRATE AND ACETAMINOPHEN 5; 325 MG/1; MG/1
1 TABLET ORAL EVERY 6 HOURS PRN
Refills: 0 | COMMUNITY
Start: 2019-05-28

## 2019-06-19 NOTE — TELEPHONE ENCOUNTER
----- Message from Danita Briones sent at 6/19/2019  1:25 PM CDT -----  Type:  Patient Returning Call    Who Called:  Patient   Who Left Message for Patient:  Patient did not know  Does the patient know what this is regarding?:  Patient did not know  Best Call Back Number:  [phone]  Additional Information:

## 2019-06-19 NOTE — PROGRESS NOTES
Subjective:    Patient ID:  Jodie Alvarez is a 86 y.o. female who presents for Coronary Artery Disease and Foot Swelling        HPI  REQUESTED OV FOR EDEMA, ECHO MOD TO SEVERE MR, 40-49% LETHA STENOSIS, LIMITED  RECORDS FROM [facility],WAS ON BUMEX BEFORE [facility], TFT'S OK, LDL 49, CR 1.5,  MILD ANGINA, NO TIA, AND SEE ROS    Past Medical History:   Diagnosis Date    Anticoagulant long-term use     Arthritis     Carotid artery occlusion     Cataract     Coronary artery disease     Hyperlipidemia     Hypertension      Past Surgical History:   Procedure Laterality Date    ANGIOPLASTY      DR SEGUNDO    APPENDECTOMY  1949    CARDIAC CATHETERIZATION      Carotid Surgery Left 1982    CATARACT EXTRACTION      COLONOSCOPY      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    Dr Low    CORONARY ARTERY BYPASS GRAFT  05/01/2006    HYSTERECTOMY  1972    complete ( age 39)    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    OOPHORECTOMY  1972    w/Hysterectomy    ORIF, FRACTURE, HUMERUS - NONUNION Right 8/21/2018    Performed by [facility]wan [facility] at [facility]    [facility]  08/14/2008    3    RK/AK Bilateral 1990    Dr Tubbs    stent aearea  04/26/2016    TONSILLECTOMY  1939    VASCULAR SURGERY       Family History   Problem Relation Age of Onset    Amblyopia Neg Hx     Blindness Neg Hx     Cataracts Neg Hx     Glaucoma Neg Hx     Macular degeneration Neg Hx     Retinal detachment Neg Hx     Strabismus Neg Hx      Social History     Socioeconomic History    Marital status:      Spouse name: Not on file    Number of children: Not on file    Years of education: Not on file    Highest education level: Not on file   Occupational History    Not on file   Social Needs    Financial resource strain: Not on file    Food insecurity:     Worry: Not on file     Inability: Not on file    Transportation needs:     Medical: Not on file     Non-medical: Not on file   Tobacco Use    Smoking status: Never Smoker    Smokeless tobacco: Never  Used   Substance and Sexual Activity    Alcohol use: No    Drug use: Never    Sexual activity: Not on file   Lifestyle    Physical activity:     Days per week: Not on file     Minutes per session: Not on file    Stress: Not on file   Relationships    Social connections:     Talks on phone: Not on file     Gets together: Not on file     Attends [employer] witness service: Not on file     Active member of club or organization: Not on file     Attends meetings of clubs or organizations: Not on file     Relationship status: Not on file   Other Topics Concern    Not on file   Social History Narrative    Not on file       Review of patient's allergies indicates:  No Known Allergies    Current Outpatient Medications:     amiodarone (PACERONE) 100 MG Tab, Take 1 tablet (100 mg total) by mouth once daily. (Patient taking differently: Take 200 mg by mouth once daily. ), Disp: 30 tablet, Rfl: 1    aspirin 325 MG tablet, Take 325 mg by mouth once daily. , Disp: , Rfl:     atorvastatin (LIPITOR) 80 MG tablet, Take 80 mg by mouth nightly., Disp: , Rfl: 3    calcium carbonate-vitamin D3 (CALTRATE WITH VITAMIN D3) 600 mg(1,500mg) -800 unit Tab, Take 1 tablet by mouth once daily., Disp: , Rfl:     clopidogrel (PLAVIX) 75 mg tablet, Take 75 mg by mouth once daily., Disp: , Rfl: 3    clorazepate (TRANXENE) 7.5 MG Tab, Take 7.5 mg by mouth 3 (three) times daily., Disp: , Rfl:     coenzyme Q10 (CO Q-10) 100 mg capsule, Take 100 mg by mouth every evening., Disp: , Rfl:     DUREZOL 0.05 % Drop ophthalmic solution, PLACE TWO DROPS INTO LEFT EYE TWICE DAILY, Disp: 5 mL, Rfl: 3    HYDROcodone-acetaminophen (NORCO) 5-325 mg per tablet, Take 1 tablet by mouth once daily., Disp: , Rfl: 0    metoprolol tartrate (LOPRESSOR) 100 MG tablet, Take 100 mg by mouth 2 (two) times daily. , Disp: , Rfl:     nitroGLYCERIN (NITROSTAT) 0.4 MG SL tablet, Place 1 tablet (0.4 mg total) under the tongue every 5 (five) minutes as needed., Disp: 25  tablet, Rfl: 0    nitroGLYCERIN 0.1 mg/hr TD PT24 (NITRODUR) 0.1 mg/hr PT24, apply ONE PATCH TO SKIN ONCE a DAY AS DIRECTED, Disp: , Rfl: 3    omega-3 fatty acids-fish oil 300-500 mg Cap, Take 1 capsule by mouth 2 (two) times daily. , Disp: , Rfl:     pantoprazole (PROTONIX) 40 MG tablet, Take 40 mg by mouth once daily. , Disp: , Rfl: 3    potassium chloride (MICRO-K) 10 MEQ CpSR, Take 10 mEq by mouth once daily., Disp: , Rfl: 0    ranolazine (RANEXA) 1,000 mg Tb12, Take 1,000 mg by mouth 2 (two) times daily., Disp: , Rfl: 2    tramadol (ULTRAM) 50 mg tablet, Take 50 mg by mouth every 8 (eight) hours as needed for Pain. , Disp: , Rfl:     bumetanide (BUMEX) 1 MG tablet, Take 1 tablet (1 mg total) by mouth once daily., Disp: 30 tablet, Rfl: 11    clobetasol 0.05% (TEMOVATE) 0.05 % Oint, APPLY TO THE AFFECTED AREA(S) LEGS TWICE DAILY wear knee high support hose, Disp: , Rfl: 2    oxyCODONE-acetaminophen (PERCOCET)  mg per tablet, Take 1 tablet by mouth every 4 (four) hours as needed for Pain., Disp: 45 tablet, Rfl: 0    prednisoLONE acetate (PRED FORTE) 1 % DrpS, Place 1 drop into the left eye 2 (two) times daily., Disp: 10 mL, Rfl: 6  No current facility-administered medications for this visit.     Facility-Administered Medications Ordered in Other Visits:     sodium chloride 0.9% flush 3 mL, 3 mL, Intravenous, PRN, Donato Awan MD    Review of Systems   Constitution: Negative for chills, diaphoresis, fever, malaise/fatigue (SOME) and night sweats.   HENT: Negative for congestion, hearing loss and nosebleeds.    Eyes: Negative for blurred vision and visual disturbance.   Cardiovascular: Positive for leg swelling. Negative for chest pain (SOME TIGHTNESS, ), claudication, cyanosis, dyspnea on exertion (MILD/ MOD), irregular heartbeat, near-syncope, orthopnea, palpitations (LESS), paroxysmal nocturnal dyspnea and syncope.   Respiratory: Negative for cough, hemoptysis, shortness of breath (SOME) and  "wheezing.    Endocrine: Negative for cold intolerance, heat intolerance and polyuria.   Hematologic/Lymphatic: Negative for adenopathy. Does not bruise/bleed easily.   Skin: Negative for color change, itching and rash.   Musculoskeletal: Negative for back pain (SOME), falls and stiffness.   Gastrointestinal: Negative for abdominal pain, change in bowel habit, dysphagia, heartburn, hematemesis, jaundice, melena and vomiting.   Genitourinary: Negative for dysuria, flank pain and frequency. Bladder incontinence: SOME.   Neurological: Negative for brief paralysis, dizziness (OCC), focal weakness, light-headedness, loss of balance, sensory change, tremors and weakness.   Psychiatric/Behavioral: Negative for altered mental status, depression and memory loss. The patient is not nervous/anxious.    Allergic/Immunologic: Negative for hives and persistent infections.        Objective:      Vitals:    06/19/19 1524   BP: (!) 110/56   Pulse: 60   SpO2: 95%   Weight: 65.4 kg (144 lb 2.9 oz)   Height: 5' 6" (1.676 m)   PainSc:   6   PainLoc: Back     Body mass index is 23.27 kg/m².    Physical Exam   Constitutional: She is oriented to person, place, and time. She appears well-developed and well-nourished. She is active.   HENT:   Head: Normocephalic and atraumatic.   Mouth/Throat: Oropharynx is clear and moist and mucous membranes are normal.   Eyes: Pupils are equal, round, and reactive to light. Conjunctivae and EOM are normal.   Neck: Trachea normal and normal range of motion. Neck supple. Normal carotid pulses, no hepatojugular reflux and no JVD present. Carotid bruit is present. No edema and no erythema present. No thyromegaly present.       R CEA SACR   Cardiovascular: Normal rate and regular rhythm.  No extrasystoles are present. PMI is not displaced. Exam reveals no gallop and no friction rub.   Murmur heard.  High-pitched blowing systolic murmur is present with a grade of 2/6 at the apex.  Pulses:       Carotid pulses " are 2+ on the right side with bruit, and 2+ on the left side with bruit.       Radial pulses are 2+ on the right side, and 2+ on the left side.        Femoral pulses are 2+ on the right side with bruit, and 2+ on the left side with bruit.       Dorsalis pedis pulses are 2+ on the right side, and 2+ on the left side.        Posterior tibial pulses are 2+ on the right side, and 2+ on the left side.   Pulmonary/Chest: Effort normal and breath sounds normal. No tachypnea and no bradypnea. She has no wheezes. She has no rales. She exhibits no tenderness.   STERNOTOMY SCAR   Abdominal: Soft. Bowel sounds are normal. She exhibits no distension and no mass. There is no hepatosplenomegaly. There is no CVA tenderness.   Musculoskeletal: Normal range of motion. She exhibits no edema or tenderness.   +1+2 EDEMA   Lymphadenopathy:     She has no cervical adenopathy.   Neurological: She is alert and oriented to person, place, and time. She has normal strength. She displays no tremor. No cranial nerve deficit.   Skin: Skin is warm and dry. No cyanosis or erythema. No pallor.   Psychiatric: She has a normal mood and affect. Her speech is normal and behavior is normal.               ..    Chemistry        Component Value Date/Time     06/06/2019 1000    K 4.5 06/06/2019 1000     06/06/2019 1000    CO2 29 06/06/2019 1000    BUN 19 06/06/2019 1000    CREATININE 1.5 (H) 06/06/2019 1000    GLU 88 06/06/2019 1000        Component Value Date/Time    CALCIUM 9.8 06/06/2019 1000    ALKPHOS 71 06/06/2019 1000    AST 31 06/06/2019 1000    ALT 19 06/06/2019 1000    BILITOT 0.4 06/06/2019 1000    ESTGFRAFRICA 36.1 (A) 06/06/2019 1000    EGFRNONAA 31.3 (A) 06/06/2019 1000            ..  Lab Results   Component Value Date    CHOL 121 06/06/2019     Lab Results   Component Value Date    HDL 50 06/06/2019     Lab Results   Component Value Date    LDLCALC 49.4 (L) 06/06/2019     Lab Results   Component Value Date    TRIG 108 06/06/2019      Lab Results   Component Value Date    CHOLHDL 41.3 06/06/2019     ..  Lab Results   Component Value Date    WBC 6.05 08/22/2018    HGB 10.6 (L) 06/06/2019    HCT 32.2 (L) 08/22/2018    MCV 91 08/22/2018     08/22/2018       Test(s) Reviewed  I have reviewed the following in detail:  [] Stress test   [] Angiography   [x] Echocardiogram   [x] Labs   [x] Other:       Assessment:         ICD-10-CM ICD-9-CM   1. Bilateral leg edema R60.0 782.3   2. Non-rheumatic mitral regurgitation I34.0 424.0   3. Stenosis of right carotid artery I65.21 433.10   4. Pulmonary hypertension I27.20 416.8   5. Nonrheumatic aortic valve insufficiency I35.1 424.1     Problem List Items Addressed This Visit        Pulmonary    Pulmonary hypertension       Cardiac/Vascular    Non-rheumatic mitral regurgitation    Stenosis of right carotid artery    Nonrheumatic aortic valve insufficiency       Other    Bilateral leg edema - Primary    Relevant Orders    Basic metabolic panel           Plan:     DC FENOFIBRATE, START BUMEX 1 MG, CONSIDER MV CLIP, DISCUSSED WITH THE PATIENT, CHECK BMP IN 4 WEEKS,, SYMPTOMS OF MILD HEART FAILURE, STABLE CLASS 1-2 ANGINA, NO TIA, NO CLINICAL ARRHYTHMIA, RTC IN 3 MO      Bilateral leg edema  -     Basic metabolic panel; Future; Expected date: 07/19/2019    Non-rheumatic mitral regurgitation    Stenosis of right carotid artery  Comments:  MILD    Pulmonary hypertension    Nonrheumatic aortic valve insufficiency    Other orders  -     bumetanide (BUMEX) 1 MG tablet; Take 1 tablet (1 mg total) by mouth once daily.  Dispense: 30 tablet; Refill: 11    RTC Low level/low impact aerobic exercise 5x's/wk. Heart healthy diet and risk factor modification.    See labs and med orders.    Aerobic exercise 5x's/wk. Heart healthy diet and risk factor modification.    See labs and med orders.

## 2019-07-05 DIAGNOSIS — I34.0 NON-RHEUMATIC MITRAL REGURGITATION: Primary | ICD-10-CM

## 2019-07-05 RX ORDER — AMIODARONE HYDROCHLORIDE 100 MG/1
100 TABLET ORAL DAILY
Qty: 30 TABLET | Refills: 4 | Status: SHIPPED | OUTPATIENT
Start: 2019-07-05 | End: 2019-10-31 | Stop reason: DRUGHIGH

## 2019-07-08 ENCOUNTER — TELEPHONE (OUTPATIENT)
Dept: CARDIOLOGY | Facility: CLINIC | Age: 84
End: 2019-07-08

## 2019-07-08 NOTE — TELEPHONE ENCOUNTER
Hi, this pt insurance is requiring a tier exception for Randolazin medication can you help completing this action please.

## 2019-07-08 NOTE — TELEPHONE ENCOUNTER
----- Message from Danita Briones sent at 7/8/2019 10:23 AM CDT -----  Type: Needs Medical Advice    Who Called:  Patient   Best Call Back Number: [phone]  Additional Information: patient is requesting insurance contacted for a tier exception for Randolazin, to help with the cost of this medication.    Thank you

## 2019-07-17 ENCOUNTER — TELEPHONE (OUTPATIENT)
Dept: CARDIOLOGY | Facility: CLINIC | Age: 84
End: 2019-07-17

## 2019-07-17 NOTE — TELEPHONE ENCOUNTER
----- Message from Maryjane Rivers sent at 7/17/2019 10:21 AM CDT -----  Contact: self  Patient requesting to speak to nurse regarding medication change she received from Dr. Lira ,patient will like to ok change with Dr. Pedraza please per patient       Please call to advice [phone] (home)

## 2019-07-17 NOTE — TELEPHONE ENCOUNTER
----- Message from Maryjane Rivers sent at 7/17/2019 10:21 AM CDT -----  Contact: self  Patient requesting to speak to nurse regarding medication change she received from Dr. Lira ,patient will like to ok change with Dr. Pedraza please per patient       Please call to advice [phone] (home)

## 2019-07-17 NOTE — TELEPHONE ENCOUNTER
Please advise: patient stated Dr. Lira has restarted her fenofibrate and her triglycerides have improved. Also he started her on vascepa, she just wanted to verify that was okay with you.

## 2019-07-18 DIAGNOSIS — I27.20 PULMONARY HYPERTENSION: Primary | ICD-10-CM

## 2019-07-18 RX ORDER — RANOLAZINE 1000 MG/1
1000 TABLET, EXTENDED RELEASE ORAL 2 TIMES DAILY
Qty: 180 TABLET | Refills: 1 | Status: SHIPPED | OUTPATIENT
Start: 2019-07-18 | End: 2020-04-23 | Stop reason: SDUPTHER

## 2019-07-23 DIAGNOSIS — I10 ESSENTIAL HYPERTENSION: Primary | ICD-10-CM

## 2019-07-23 RX ORDER — METOPROLOL TARTRATE 100 MG/1
100 TABLET ORAL 2 TIMES DAILY
Qty: 60 TABLET | Refills: 2 | Status: SHIPPED | OUTPATIENT
Start: 2019-07-23 | End: 2019-10-30 | Stop reason: SDUPTHER

## 2019-10-30 ENCOUNTER — TELEPHONE (OUTPATIENT)
Dept: CARDIOLOGY | Facility: CLINIC | Age: 84
End: 2019-10-30

## 2019-10-30 DIAGNOSIS — I10 ESSENTIAL HYPERTENSION: ICD-10-CM

## 2019-10-30 RX ORDER — METOPROLOL TARTRATE 100 MG/1
100 TABLET ORAL 2 TIMES DAILY
Qty: 60 TABLET | Refills: 4 | Status: SHIPPED | OUTPATIENT
Start: 2019-10-30 | End: 2020-06-02

## 2019-10-30 NOTE — TELEPHONE ENCOUNTER
----- Message from Mehnaz Saenz sent at 10/30/2019  9:28 AM CDT -----  Contact: Jodie  Type:  Same Day Appointment Request    Caller is requesting a same day appointment.  Caller declined first available appointment listed below.      Name of Caller:  patient  Best Call Back Number: [phone]  Additional Information:  Patient states she fainted day before yesterday--patient did not go to ED--patient states she isn't having any chest pains or SOB--states this has happened 3 times since her surgery in March--would like to be seen today in [place]--please advise--thank you

## 2019-10-31 ENCOUNTER — OFFICE VISIT (OUTPATIENT)
Dept: CARDIOLOGY | Facility: CLINIC | Age: 84
End: 2019-10-31
Payer: MEDICARE

## 2019-10-31 VITALS
HEIGHT: 66 IN | WEIGHT: 138.25 LBS | BODY MASS INDEX: 22.22 KG/M2 | HEART RATE: 61 BPM | DIASTOLIC BLOOD PRESSURE: 71 MMHG | SYSTOLIC BLOOD PRESSURE: 136 MMHG

## 2019-10-31 DIAGNOSIS — Z79.899 LONG TERM CURRENT USE OF AMIODARONE: ICD-10-CM

## 2019-10-31 DIAGNOSIS — R55 SYNCOPE AND COLLAPSE: Primary | ICD-10-CM

## 2019-10-31 DIAGNOSIS — I25.708 CORONARY ARTERY DISEASE INVOLVING CORONARY BYPASS GRAFT OF NATIVE HEART WITH OTHER FORMS OF ANGINA PECTORIS: ICD-10-CM

## 2019-10-31 PROCEDURE — 99999 PR PBB SHADOW E&M-EST. PATIENT-LVL IV: CPT | Mod: PBBFAC,,, | Performed by: INTERNAL MEDICINE

## 2019-10-31 PROCEDURE — 99999 PR PBB SHADOW E&M-EST. PATIENT-LVL IV: ICD-10-PCS | Mod: PBBFAC,,, | Performed by: INTERNAL MEDICINE

## 2019-10-31 PROCEDURE — 99214 OFFICE O/P EST MOD 30 MIN: CPT | Mod: PBBFAC,PO | Performed by: INTERNAL MEDICINE

## 2019-10-31 PROCEDURE — 99214 PR OFFICE/OUTPT VISIT, EST, LEVL IV, 30-39 MIN: ICD-10-PCS | Mod: S$PBB,,, | Performed by: INTERNAL MEDICINE

## 2019-10-31 PROCEDURE — 99214 OFFICE O/P EST MOD 30 MIN: CPT | Mod: S$PBB,,, | Performed by: INTERNAL MEDICINE

## 2019-10-31 RX ORDER — AMIODARONE HYDROCHLORIDE 100 MG/1
100 TABLET ORAL EVERY OTHER DAY
Qty: 15 TABLET | Refills: 1 | Status: SHIPPED | OUTPATIENT
Start: 2019-10-31 | End: 2020-01-16 | Stop reason: SDUPTHER

## 2019-10-31 RX ORDER — ICOSAPENT ETHYL 1000 MG/1
1 CAPSULE ORAL 2 TIMES DAILY
Refills: 0 | COMMUNITY
Start: 2019-10-15

## 2019-10-31 RX ORDER — LANOLIN ALCOHOL/MO/W.PET/CERES
400 CREAM (GRAM) TOPICAL
COMMUNITY
End: 2019-11-07

## 2019-10-31 RX ORDER — LISINOPRIL AND HYDROCHLOROTHIAZIDE 12.5; 2 MG/1; MG/1
TABLET ORAL
COMMUNITY
End: 2019-11-07

## 2019-10-31 NOTE — PATIENT INSTRUCTIONS
Loop Recorder Insertion    Arrive for your procedure at: [facility] on 11/12 for your 9am procedure.    NO PREP IS NECESSARY    The EQ workstronic loop recorder monitors a persons heart rate 24 hours a day and has a battery life of up to 3 years.  The device is approximately one-third of the size of a AAA battery and is also safe for use in an MRI.  It is placed beneath the skin through a small incision in the left upper side of the chest wall.  The procedure is minimally invasive so anesthesia is not needed to implant the device.  The procedure can be done in about 10 minutes.      ? You may eat or drink the day of the procedure.    ? Please take all of your medications on the day of scheduled procedure.    ? This WILL NOT require anesthesia or an IV.    ? You DO NOT need someone to drive you home.

## 2019-10-31 NOTE — PROGRESS NOTES
Subjective:    Patient ID:  Jodie Alvarez is a 86 y.o. female who presents for Loss of Consciousness; Coronary Artery Disease; and Hypertension        HPI  REQUESTED OFFICE VISIT BECAUSE OF SYNCOPAL EPISODE, THIRD EPISODE SINCE LAST ADMISSION TO [facility], .FEELS IT COMING, BLACKS OUT, AFTER STANDING FIRST TIME AND SECOND POST SHORT SITTING, THIS TIME, NO WARNING, ALSO TRYING TO GET UP, HAD BRUISES, BP LOW AT TIMES, AND FLUCTUATES, ALSO SYMPTOMS AFTER SQUATTING, NOT SLEEPING WELL B/O ,STILL TAKING AMIODARONE ??? 200 MG,  DESPITE DOSE ADJUSTMENT LAST TIME, ACCOMPANIED BY A FRIEND, SEE ROS    Past Medical History:   Diagnosis Date    Anticoagulant long-term use     Arthritis     Carotid artery occlusion     Cataract     Coronary artery disease     Hyperlipidemia     Hypertension      Past Surgical History:   Procedure Laterality Date    ANGIOPLASTY      DR SEGUNDO    APPENDECTOMY  1949    CARDIAC CATHETERIZATION      Carotid Surgery Left 1982    CATARACT EXTRACTION      COLONOSCOPY      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    Dr Low    CORONARY ARTERY BYPASS GRAFT  05/01/2006    HYSTERECTOMY  1972    complete ( age 39)    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    OOPHORECTOMY  1972    w/Hysterectomy    ORIF HUMERUS FRACTURE Right 8/21/2018    Procedure: ORIF, FRACTURE, HUMERUS - NONUNION;  Surgeon: Donato Awan MD;  Location: [facility];  Service: Orthopedics;  Laterality: Right;    Pica Stits  08/14/2008    3    RK/AK Bilateral 1990    Dr Tubbs    stent aearea  04/26/2016    TONSILLECTOMY  1939    VASCULAR SURGERY       Family History   Problem Relation Age of Onset    Amblyopia Neg Hx     Blindness Neg Hx     Cataracts Neg Hx     Glaucoma Neg Hx     Macular degeneration Neg Hx     Retinal detachment Neg Hx     Strabismus Neg Hx      Social History     Socioeconomic History    Marital status:      Spouse name: Not on file    Number of children: Not on file    Years of education: Not on  file    Highest education level: Not on file   Occupational History    Not on file   Social Needs    Financial resource strain: Not on file    Food insecurity:     Worry: Not on file     Inability: Not on file    Transportation needs:     Medical: Not on file     Non-medical: Not on file   Tobacco Use    Smoking status: Never Smoker    Smokeless tobacco: Never Used   Substance and Sexual Activity    Alcohol use: No    Drug use: Never    Sexual activity: Not on file   Lifestyle    Physical activity:     Days per week: Not on file     Minutes per session: Not on file    Stress: Not on file   Relationships    Social connections:     Talks on phone: Not on file     Gets together: Not on file     Attends [employer] service: Not on file     Active member of club or organization: Not on file     Attends meetings of clubs or organizations: Not on file     Relationship status: Not on file   Other Topics Concern    Not on file   Social History Narrative    Not on file       Review of patient's allergies indicates:  No Known Allergies    Current Outpatient Medications:     aspirin 325 MG tablet, Take 325 mg by mouth once daily. , Disp: , Rfl:     atorvastatin (LIPITOR) 80 MG tablet, Take 80 mg by mouth nightly., Disp: , Rfl: 3    bumetanide (BUMEX) 1 MG tablet, Take 1 tablet (1 mg total) by mouth once daily., Disp: 30 tablet, Rfl: 11    calcium carbonate-vitamin D3 (CALTRATE WITH VITAMIN D3) 600 mg(1,500mg) -800 unit Tab, Take 1 tablet by mouth once daily., Disp: , Rfl:     clobetasol 0.05% (TEMOVATE) 0.05 % Oint, APPLY TO THE AFFECTED AREA(S) LEGS TWICE DAILY wear knee high support hose, Disp: , Rfl: 2    clopidogrel (PLAVIX) 75 mg tablet, Take 75 mg by mouth once daily., Disp: , Rfl: 3    clorazepate (TRANXENE) 7.5 MG Tab, Take 7.5 mg by mouth 3 (three) times daily., Disp: , Rfl:     coenzyme Q10 (CO Q-10) 100 mg capsule, Take 100 mg by mouth every evening., Disp: , Rfl:     DUREZOL 0.05 % Drop  ophthalmic solution, PLACE TWO DROPS INTO LEFT EYE TWICE DAILY, Disp: 5 mL, Rfl: 3    FLUARIX QUAD [id number], PF, 60 mcg (15 mcg x 4)/0.5 mL Syrg, to be administered by pharmacist for immunization, Disp: , Rfl: 0    HYDROcodone-acetaminophen (NORCO) 5-325 mg per tablet, Take 1 tablet by mouth once daily., Disp: , Rfl: 0    metoprolol tartrate (LOPRESSOR) 100 MG tablet, Take 1 tablet (100 mg total) by mouth 2 (two) times daily., Disp: 60 tablet, Rfl: 4    nitroGLYCERIN (NITROSTAT) 0.4 MG SL tablet, Place 1 tablet (0.4 mg total) under the tongue every 5 (five) minutes as needed., Disp: 25 tablet, Rfl: 0    nitroGLYCERIN 0.1 mg/hr TD PT24 (NITRODUR) 0.1 mg/hr PT24, apply ONE PATCH TO SKIN ONCE a DAY AS DIRECTED, Disp: , Rfl: 3    pantoprazole (PROTONIX) 40 MG tablet, Take 40 mg by mouth once daily. , Disp: , Rfl: 3    potassium chloride (MICRO-K) 10 MEQ CpSR, Take 10 mEq by mouth once daily., Disp: , Rfl: 0    prednisoLONE acetate (PRED FORTE) 1 % DrpS, Place 1 drop into the left eye 2 (two) times daily., Disp: 10 mL, Rfl: 6    ranolazine (RANEXA) 1,000 mg Tb12, Take 1 tablet (1,000 mg total) by mouth 2 (two) times daily., Disp: 180 tablet, Rfl: 1    VASCEPA 1 gram Cap, Take 1 tablet by mouth 2 (two) times daily., Disp: , Rfl: 0    amiodarone (PACERONE) 100 MG Tab, Take 1 tablet (100 mg total) by mouth every other day., Disp: 15 tablet, Rfl: 1    lisinopril-hydrochlorothiazide (PRINZIDE,ZESTORETIC) 20-12.5 mg per tablet, lisinopril 20 mg-hydrochlorothiazide 12.5 mg tablet, Disp: , Rfl:     magnesium oxide (MAG-OX) 400 mg (241.3 mg magnesium) tablet, Take 400 mg by mouth., Disp: , Rfl:     oxyCODONE-acetaminophen (PERCOCET)  mg per tablet, Take 1 tablet by mouth every 4 (four) hours as needed for Pain. (Patient not taking: Reported on 10/31/2019), Disp: 45 tablet, Rfl: 0    tramadol (ULTRAM) 50 mg tablet, Take 50 mg by mouth every 8 (eight) hours as needed for Pain. , Disp: , Rfl:   No current  "facility-administered medications for this visit.     Facility-Administered Medications Ordered in Other Visits:     sodium chloride 0.9% flush 3 mL, 3 mL, Intravenous, PRN, Donato Awan MD    Review of Systems   Constitution: Negative for chills, diaphoresis, fever, malaise/fatigue (SOME) and night sweats.   HENT: Negative for congestion, hearing loss and nosebleeds.    Eyes: Negative for blurred vision and visual disturbance.   Cardiovascular: Positive for leg swelling (SOME), near-syncope and syncope. Negative for chest pain, claudication, cyanosis, dyspnea on exertion (MILD/ MOD), irregular heartbeat, orthopnea, palpitations (LESS) and paroxysmal nocturnal dyspnea.   Respiratory: Negative for cough, hemoptysis, shortness of breath (SOME) and wheezing.    Endocrine: Negative for cold intolerance, heat intolerance and polyuria.   Hematologic/Lymphatic: Negative for adenopathy. Does not bruise/bleed easily.   Skin: Negative for color change, itching and rash.   Musculoskeletal: Negative for back pain (SOME), falls and stiffness.   Gastrointestinal: Negative for abdominal pain, change in bowel habit, dysphagia, heartburn, hematemesis, jaundice, melena and vomiting.   Genitourinary: Negative for dysuria, flank pain and frequency. Bladder incontinence: SOME.   Neurological: Negative for brief paralysis, dizziness (OCC), focal weakness, light-headedness, loss of balance, sensory change, tremors and weakness.   Psychiatric/Behavioral: Negative for altered mental status, depression and memory loss. The patient is not nervous/anxious.    Allergic/Immunologic: Negative for hives and persistent infections.        Objective:      Vitals:    10/31/19 1153   BP: 136/71   Pulse: 61   Weight: 62.7 kg (138 lb 3.7 oz)   Height: 5' 6" (1.676 m)   PainSc: 0-No pain     Body mass index is 22.31 kg/m².    Physical Exam   Constitutional: She is oriented to person, place, and time. She appears well-developed and well-nourished. She is " active.   HENT:   Head: Normocephalic and atraumatic.   Mouth/Throat: Oropharynx is clear and moist and mucous membranes are normal.   Eyes: Pupils are equal, round, and reactive to light. Conjunctivae and EOM are normal.   Neck: Trachea normal and normal range of motion. Neck supple. Normal carotid pulses, no hepatojugular reflux and no JVD present. Carotid bruit is present. No edema and no erythema present. No thyromegaly present.       R CEA SACR   Cardiovascular: Normal rate and regular rhythm.  No extrasystoles are present. PMI is not displaced. Exam reveals no gallop and no friction rub.   Murmur heard.  High-pitched blowing systolic murmur is present with a grade of 2/6 at the apex.  Pulses:       Carotid pulses are 2+ on the right side with bruit, and 2+ on the left side with bruit.       Radial pulses are 2+ on the right side, and 2+ on the left side.        Femoral pulses are 2+ on the right side with bruit, and 2+ on the left side with bruit.       Dorsalis pedis pulses are 2+ on the right side, and 2+ on the left side.        Posterior tibial pulses are 2+ on the right side, and 2+ on the left side.   Pulmonary/Chest: Effort normal and breath sounds normal. No tachypnea and no bradypnea. She has no wheezes. She has no rales. She exhibits no tenderness.   STERNOTOMY SCAR   Abdominal: Soft. Bowel sounds are normal. She exhibits no distension and no mass. There is no hepatosplenomegaly. There is no CVA tenderness.   Musculoskeletal: Normal range of motion. She exhibits no edema or tenderness.   +1 EDEMA   Lymphadenopathy:     She has no cervical adenopathy.   Neurological: She is alert and oriented to person, place, and time. She has normal strength. She displays no tremor. No cranial nerve deficit.   Skin: Skin is warm and dry. No cyanosis or erythema. No pallor.   BRUISES ON LEFT ARM AND LEG   Psychiatric: She has a normal mood and affect. Her speech is normal and behavior is normal.               ..     Chemistry        Component Value Date/Time     06/06/2019 1000    K 4.5 06/06/2019 1000     06/06/2019 1000    CO2 29 06/06/2019 1000    BUN 19 06/06/2019 1000    CREATININE 1.5 (H) 06/06/2019 1000    GLU 88 06/06/2019 1000        Component Value Date/Time    CALCIUM 9.8 06/06/2019 1000    ALKPHOS 71 06/06/2019 1000    AST 31 06/06/2019 1000    ALT 19 06/06/2019 1000    BILITOT 0.4 06/06/2019 1000    ESTGFRAFRICA 36.1 (A) 06/06/2019 1000    EGFRNONAA 31.3 (A) 06/06/2019 1000            ..  Lab Results   Component Value Date    CHOL 121 06/06/2019     Lab Results   Component Value Date    HDL 50 06/06/2019     Lab Results   Component Value Date    LDLCALC 49.4 (L) 06/06/2019     Lab Results   Component Value Date    TRIG 108 06/06/2019     Lab Results   Component Value Date    CHOLHDL 41.3 06/06/2019     ..  Lab Results   Component Value Date    WBC 6.05 08/22/2018    HGB 10.6 (L) 06/06/2019    HCT 32.2 (L) 08/22/2018    MCV 91 08/22/2018     08/22/2018       Test(s) Reviewed  I have reviewed the following in detail:  [] Stress test   [] Angiography   [] Echocardiogram   [] Labs   [x] Other:       Assessment:         ICD-10-CM ICD-9-CM   1. Syncope and collapse R55 780.2   2. Long term current use of amiodarone Z79.899 V58.69   3. Coronary artery disease involving coronary bypass graft of native heart with other forms of angina pectoris I25.708 414.05     413.9     Problem List Items Addressed This Visit        Cardiac/Vascular    CAD (coronary artery disease)    Long term current use of amiodarone       Other    Syncope and collapse - Primary           Plan:     LONG DISCUSSION WITH THE PATIENT AND A FRIEND, SUPPORT STOCKING , AVOID SUDDEN CHANGE IN POSITION, AVOID SQUATTING SINCE THERE IS EVIDENCE OF DYSAUTONOMIA, DECREASE AMIODARONE  QOD, SCHEDULE FOR LOOP RECORDER IN VIEW OF POSSIBLE SIGNIFICANT ARRHYTHMIA,ALL OTHER CV CLINICALLY STABLE, CLASS 1 ANGINA, NO HF, NO TIA, ASSESS CLINICAL  ARRHYTHMIA,CONTINUE CURRENT MEDS, EDUCATION, DIET, EXERCISE, WALKING, DISCUSSED PLAN WITH THE PATIENT AND HER FRIEND DOES LICHEN DAUGHTER      Syncope and collapse    Long term current use of amiodarone    Coronary artery disease involving coronary bypass graft of native heart with other forms of angina pectoris    Other orders  -     amiodarone (PACERONE) 100 MG Tab; Take 1 tablet (100 mg total) by mouth every other day.  Dispense: 15 tablet; Refill: 1    RTC Low level/low impact aerobic exercise 5x's/wk. Heart healthy diet and risk factor modification.    See labs and med orders.    Aerobic exercise 5x's/wk. Heart healthy diet and risk factor modification.    See labs and med orders.

## 2019-11-12 DIAGNOSIS — R55 SYNCOPE AND COLLAPSE: Primary | ICD-10-CM

## 2019-11-12 DIAGNOSIS — Z95.818 STATUS POST PLACEMENT OF IMPLANTABLE LOOP RECORDER: ICD-10-CM

## 2019-11-26 ENCOUNTER — CLINICAL SUPPORT (OUTPATIENT)
Dept: CARDIOLOGY | Facility: CLINIC | Age: 84
End: 2019-11-26
Attending: INTERNAL MEDICINE
Payer: MEDICARE

## 2019-11-26 DIAGNOSIS — R55 SYNCOPE AND COLLAPSE: ICD-10-CM

## 2019-11-26 DIAGNOSIS — Z95.818 STATUS POST PLACEMENT OF IMPLANTABLE LOOP RECORDER: ICD-10-CM

## 2019-12-12 ENCOUNTER — CLINICAL SUPPORT (OUTPATIENT)
Dept: CARDIOLOGY | Facility: CLINIC | Age: 84
End: 2019-12-12
Payer: MEDICARE

## 2019-12-12 PROCEDURE — 93298 CARDIAC DEVICE CHECK - REMOTE: ICD-10-PCS | Mod: ,,, | Performed by: INTERNAL MEDICINE

## 2019-12-12 PROCEDURE — 93298 REM INTERROG DEV EVAL SCRMS: CPT | Mod: ,,, | Performed by: INTERNAL MEDICINE

## 2019-12-17 ENCOUNTER — NURSE TRIAGE (OUTPATIENT)
Dept: ADMINISTRATIVE | Facility: CLINIC | Age: 84
End: 2019-12-17

## 2019-12-17 NOTE — TELEPHONE ENCOUNTER
Sunday, Jodie experienced chest pain while seated when home from [facility]. It was severe pain.  Took NTG SL x 2, and in 30 min she felt ok again. Of note, the pain worsened again last night, she took two NTG SL last night without relief, and states she is also wearing a continuous NTG patch transdermal.  Now she is experiencing heaviness in her left chest, constant and present now. Loop recorder inserted 11/12/19.  Per [facility] triage protocol, recommend call 911 for immediate medical attention, but she declines.  Wants a call from Dr Pedraza's office, and an appointment today.  Message to Dr Pedraza. Please contact caller directly at [phone] with any additional care advice.      Reason for Disposition   Chest pain lasting longer than 5 minutes and ANY of the following:* Over 50 years old* Over 30 years old and at least one cardiac risk factor (i.e., high blood pressure, diabetes, high cholesterol, obesity, smoker or strong family history of heart disease)* Pain is crushing, pressure-like, or heavy * Took nitroglycerin and chest pain was not relieved* History of heart disease (i.e., angina, heart attack, bypass surgery, angioplasty, CHF)    Additional Information   Negative: Severe difficulty breathing (e.g., struggling for each breath, speaks in single words)   Negative: Passed out (i.e., fainted, collapsed and was not responding)    Protocols used: CHEST PAIN-A-OH

## 2019-12-18 ENCOUNTER — OFFICE VISIT (OUTPATIENT)
Dept: CARDIOLOGY | Facility: CLINIC | Age: 84
End: 2019-12-18
Payer: MEDICARE

## 2019-12-18 DIAGNOSIS — I10 ESSENTIAL HYPERTENSION: ICD-10-CM

## 2019-12-18 DIAGNOSIS — I25.700 CORONARY ARTERY DISEASE INVOLVING CORONARY BYPASS GRAFT OF NATIVE HEART WITH UNSTABLE ANGINA PECTORIS: Primary | ICD-10-CM

## 2019-12-18 DIAGNOSIS — I25.708 CORONARY ARTERY DISEASE INVOLVING CORONARY BYPASS GRAFT OF NATIVE HEART WITH OTHER FORMS OF ANGINA PECTORIS: ICD-10-CM

## 2019-12-18 DIAGNOSIS — I27.20 PULMONARY HYPERTENSION: ICD-10-CM

## 2019-12-18 PROCEDURE — 1159F MED LIST DOCD IN RCRD: CPT | Mod: S$GLB,,, | Performed by: INTERNAL MEDICINE

## 2019-12-18 PROCEDURE — 1159F PR MEDICATION LIST DOCUMENTED IN MEDICAL RECORD: ICD-10-PCS | Mod: S$GLB,,, | Performed by: INTERNAL MEDICINE

## 2019-12-18 PROCEDURE — 1125F PR PAIN SEVERITY QUANTIFIED, PAIN PRESENT: ICD-10-PCS | Mod: S$GLB,,, | Performed by: INTERNAL MEDICINE

## 2019-12-18 PROCEDURE — 99214 PR OFFICE/OUTPT VISIT, EST, LEVL IV, 30-39 MIN: ICD-10-PCS | Mod: S$GLB,,, | Performed by: INTERNAL MEDICINE

## 2019-12-18 PROCEDURE — 93010 ELECTROCARDIOGRAM REPORT: CPT | Mod: S$PBB,,, | Performed by: INTERNAL MEDICINE

## 2019-12-18 PROCEDURE — 93010 EKG 12-LEAD: ICD-10-PCS | Mod: S$PBB,,, | Performed by: INTERNAL MEDICINE

## 2019-12-18 PROCEDURE — 1125F AMNT PAIN NOTED PAIN PRSNT: CPT | Mod: S$GLB,,, | Performed by: INTERNAL MEDICINE

## 2019-12-18 PROCEDURE — 99214 OFFICE O/P EST MOD 30 MIN: CPT | Mod: S$GLB,,, | Performed by: INTERNAL MEDICINE

## 2019-12-18 RX ORDER — NITROGLYCERIN 40 MG/1
1 PATCH TRANSDERMAL DAILY
Qty: 30 PATCH | Refills: 2 | Status: SHIPPED | OUTPATIENT
Start: 2019-12-18 | End: 2020-01-15 | Stop reason: SDUPTHER

## 2019-12-18 RX ORDER — CLONIDINE HYDROCHLORIDE 0.1 MG/1
0.1 TABLET ORAL NIGHTLY
Refills: 3 | COMMUNITY
Start: 2019-12-11 | End: 2019-12-18 | Stop reason: ALTCHOICE

## 2019-12-18 RX ORDER — AMLODIPINE BESYLATE 2.5 MG/1
2.5 TABLET ORAL NIGHTLY
Qty: 30 TABLET | Refills: 2 | Status: SHIPPED | OUTPATIENT
Start: 2019-12-18 | End: 2020-04-07 | Stop reason: SDUPTHER

## 2019-12-18 NOTE — PROGRESS NOTES
Subjective:    Patient ID:  Jodie Alvarez is a 86 y.o. female who presents for Chest Pain        HPI  REQUESTED OV, SEVERE CP FEW DAYS AGO AFTER [facility], AT REST RELIEVED WITH SL NTG, LAST SUN, THEN MON HEAVINESS, REFUSED TO GO TO THE EMERGENCY ROOM, NO ASSOCIATED NEAR-SYNCOPE OF SIGNIFICANT PALPITATION MILD SHORTNESS OF BREATH NO TIA TYPE SYMPTOMS NO NEAR-SYNCOPE, SEE REVIEW OF SYSTEMS    Past Medical History:   Diagnosis Date    Anticoagulant long-term use     Arthritis     Carotid artery occlusion     Cataract     Coronary artery disease     Hyperlipidemia     Hypertension      Past Surgical History:   Procedure Laterality Date    ANGIOPLASTY      DR SEGUNDO    APPENDECTOMY  1949    CARDIAC CATHETERIZATION      Carotid Surgery Left 1982    CATARACT EXTRACTION      COLONOSCOPY      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    Dr Low    CORONARY ARTERY BYPASS GRAFT  05/01/2006    HYSTERECTOMY  1972    complete ( age 39)    INSERTION OF IMPLANTABLE LOOP RECORDER N/A 11/12/2019    Procedure: Insertion, Implantable Loop Recorder;  Surgeon: Karlo Pedraza MD;  Location: [facility];  Service: Cardiology;  Laterality: N/A;    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    OOPHORECTOMY  1972    w/Hysterectomy    ORIF HUMERUS FRACTURE Right 8/21/2018    Procedure: ORIF, FRACTURE, HUMERUS - NONUNION;  Surgeon: Donato Awan MD;  Location: [facility];  Service: Orthopedics;  Laterality: Right;    Pica Stits  08/14/2008    3    RK/AK Bilateral 1990    Dr Arley sahni aearea  04/26/2016    TONSILLECTOMY  1939    VASCULAR SURGERY       Family History   Problem Relation Age of Onset    Amblyopia Neg Hx     Blindness Neg Hx     Cataracts Neg Hx     Glaucoma Neg Hx     Macular degeneration Neg Hx     Retinal detachment Neg Hx     Strabismus Neg Hx      Social History     Socioeconomic History    Marital status:      Spouse name: Not on file    Number of children: Not on file    Years of education: Not on file     Highest education level: Not on file   Occupational History    Not on file   Social Needs    Financial resource strain: Not on file    Food insecurity:     Worry: Not on file     Inability: Not on file    Transportation needs:     Medical: Not on file     Non-medical: Not on file   Tobacco Use    Smoking status: Never Smoker    Smokeless tobacco: Never Used   Substance and Sexual Activity    Alcohol use: No    Drug use: Never    Sexual activity: Not on file   Lifestyle    Physical activity:     Days per week: Not on file     Minutes per session: Not on file    Stress: Not on file   Relationships    Social connections:     Talks on phone: Not on file     Gets together: Not on file     Attends [employer] service: Not on file     Active member of club or organization: Not on file     Attends meetings of clubs or organizations: Not on file     Relationship status: Not on file   Other Topics Concern    Not on file   Social History Narrative    Not on file       Review of patient's allergies indicates:  No Known Allergies    Current Outpatient Medications:     amiodarone (PACERONE) 100 MG Tab, Take 1 tablet (100 mg total) by mouth every other day., Disp: 15 tablet, Rfl: 1    aspirin 325 MG tablet, Take 325 mg by mouth once daily. , Disp: , Rfl:     atorvastatin (LIPITOR) 80 MG tablet, Take 80 mg by mouth nightly., Disp: , Rfl: 3    bumetanide (BUMEX) 1 MG tablet, Take 1 tablet (1 mg total) by mouth once daily., Disp: 30 tablet, Rfl: 11    calcium carbonate-vitamin D3 (CALTRATE WITH VITAMIN D3) 600 mg(1,500mg) -800 unit Tab, Take 1 tablet by mouth once daily., Disp: , Rfl:     clobetasol 0.05% (TEMOVATE) 0.05 % Oint, APPLY TO THE AFFECTED AREA(S) LEGS TWICE DAILY wear knee high support hose, Disp: , Rfl: 2    clopidogrel (PLAVIX) 75 mg tablet, Take 75 mg by mouth once daily., Disp: , Rfl: 3    clorazepate (TRANXENE) 7.5 MG Tab, Take 7.5 mg by mouth 3 (three) times daily., Disp: , Rfl:     coenzyme Q10  (CO Q-10) 100 mg capsule, Take 100 mg by mouth every evening., Disp: , Rfl:     DUREZOL 0.05 % Drop ophthalmic solution, PLACE TWO DROPS INTO LEFT EYE TWICE DAILY, Disp: 5 mL, Rfl: 3    FLUARIX QUAD 5428-1857, PF, 60 mcg (15 mcg x 4)/0.5 mL Syrg, to be administered by pharmacist for immunization, Disp: , Rfl: 0    HYDROcodone-acetaminophen (NORCO) 5-325 mg per tablet, Take 1 tablet by mouth every 6 (six) hours as needed. , Disp: , Rfl: 0    metoprolol tartrate (LOPRESSOR) 100 MG tablet, Take 1 tablet (100 mg total) by mouth 2 (two) times daily., Disp: 60 tablet, Rfl: 4    nitroGLYCERIN (NITROSTAT) 0.4 MG SL tablet, Place 1 tablet (0.4 mg total) under the tongue every 5 (five) minutes as needed., Disp: 25 tablet, Rfl: 0    pantoprazole (PROTONIX) 40 MG tablet, Take 40 mg by mouth once daily. , Disp: , Rfl: 3    potassium chloride (MICRO-K) 10 MEQ CpSR, Take 10 mEq by mouth once daily., Disp: , Rfl: 0    ranolazine (RANEXA) 1,000 mg Tb12, Take 1 tablet (1,000 mg total) by mouth 2 (two) times daily., Disp: 180 tablet, Rfl: 1    VASCEPA 1 gram Cap, Take 1 tablet by mouth 2 (two) times daily., Disp: , Rfl: 0    amLODIPine (NORVASC) 2.5 MG tablet, Take 1 tablet (2.5 mg total) by mouth every evening., Disp: 30 tablet, Rfl: 2    nitroGLYCERIN 0.2 mg/hr TD PT24 (NITRODUR) 0.2 mg/hr, Place 1 patch onto the skin once daily., Disp: 30 patch, Rfl: 2  No current facility-administered medications for this visit.     Facility-Administered Medications Ordered in Other Visits:     sodium chloride 0.9% flush 3 mL, 3 mL, Intravenous, PRN, Donato Awan MD    Review of Systems   Constitution: Negative for chills, diaphoresis, fever, malaise/fatigue (SOME) and night sweats.   HENT: Negative for congestion, hearing loss and nosebleeds.    Eyes: Negative for blurred vision and visual disturbance.   Cardiovascular: Positive for chest pain and leg swelling (SOME). Negative for claudication, cyanosis, dyspnea on exertion (MILD/  "MOD), irregular heartbeat, near-syncope, orthopnea, palpitations (LESS), paroxysmal nocturnal dyspnea and syncope.   Respiratory: Negative for cough, hemoptysis, shortness of breath (SOME) and wheezing.    Endocrine: Negative for cold intolerance, heat intolerance and polyuria.   Hematologic/Lymphatic: Negative for adenopathy. Does not bruise/bleed easily.   Skin: Negative for color change, itching and rash.   Musculoskeletal: Negative for back pain (SOME), falls and stiffness.   Gastrointestinal: Negative for abdominal pain, change in bowel habit, dysphagia, heartburn, hematemesis, jaundice, melena and vomiting.   Genitourinary: Negative for dysuria, flank pain and frequency. Bladder incontinence: SOME.   Neurological: Negative for brief paralysis, dizziness (OCC), focal weakness, light-headedness, loss of balance, sensory change, tremors and weakness.   Psychiatric/Behavioral: Negative for altered mental status, depression and memory loss. The patient is not nervous/anxious.    Allergic/Immunologic: Negative for hives and persistent infections.        Objective:      Vitals:    12/18/19 1132   BP: 138/66   Pulse: 74   SpO2: (!) 94%   Weight: 61.9 kg (136 lb 7.4 oz)   Height: 5' 6" (1.676 m)   PainSc:   7     Body mass index is 22.03 kg/m².    Physical Exam   Constitutional: She is oriented to person, place, and time. She appears well-developed and well-nourished. She is active.   HENT:   Head: Normocephalic and atraumatic.   Mouth/Throat: Oropharynx is clear and moist and mucous membranes are normal.   Eyes: Pupils are equal, round, and reactive to light. Conjunctivae and EOM are normal.   Neck: Trachea normal and normal range of motion. Neck supple. Normal carotid pulses, no hepatojugular reflux and no JVD present. Carotid bruit is present. No edema and no erythema present. No thyromegaly present.       R CEA SACR   Cardiovascular: Normal rate and regular rhythm.  No extrasystoles are present. PMI is not " displaced. Exam reveals no gallop and no friction rub.   Murmur heard.  High-pitched blowing systolic murmur is present with a grade of 2/6 at the apex.  Pulses:       Carotid pulses are 2+ on the right side with bruit, and 2+ on the left side with bruit.       Radial pulses are 2+ on the right side, and 2+ on the left side.        Femoral pulses are 2+ on the right side with bruit, and 2+ on the left side with bruit.       Dorsalis pedis pulses are 2+ on the right side, and 2+ on the left side.        Posterior tibial pulses are 2+ on the right side, and 2+ on the left side.   Pulmonary/Chest: Effort normal and breath sounds normal. No tachypnea and no bradypnea. She has no wheezes. She has no rales. She exhibits no tenderness.   STERNOTOMY SCAR   Abdominal: Soft. Bowel sounds are normal. She exhibits no distension and no mass. There is no hepatosplenomegaly. There is no CVA tenderness.   Musculoskeletal: Normal range of motion. She exhibits no edema.   TRACE TO +1 EDEMA   Lymphadenopathy:     She has no cervical adenopathy.   Neurological: She is alert and oriented to person, place, and time. She has normal strength. She displays no tremor. No cranial nerve deficit.   Skin: Skin is warm and dry. No cyanosis or erythema. No pallor.   Psychiatric: She has a normal mood and affect. Her speech is normal and behavior is normal.               ..    Chemistry        Component Value Date/Time     06/06/2019 1000    K 4.5 06/06/2019 1000     06/06/2019 1000    CO2 29 06/06/2019 1000    BUN 19 06/06/2019 1000    CREATININE 1.5 (H) 06/06/2019 1000    GLU 88 06/06/2019 1000        Component Value Date/Time    CALCIUM 9.8 06/06/2019 1000    ALKPHOS 71 06/06/2019 1000    AST 31 06/06/2019 1000    ALT 19 06/06/2019 1000    BILITOT 0.4 06/06/2019 1000    ESTGFRAFRICA 36.1 (A) 06/06/2019 1000    EGFRNONAA 31.3 (A) 06/06/2019 1000            ..  Lab Results   Component Value Date    CHOL 121 06/06/2019     Lab Results    Component Value Date    HDL 50 06/06/2019     Lab Results   Component Value Date    LDLCALC 49.4 (L) 06/06/2019     Lab Results   Component Value Date    TRIG 108 06/06/2019     Lab Results   Component Value Date    CHOLHDL 41.3 06/06/2019     ..  Lab Results   Component Value Date    WBC 6.05 08/22/2018    HGB 10.6 (L) 06/06/2019    HCT 32.2 (L) 08/22/2018    MCV 91 08/22/2018     08/22/2018       Test(s) Reviewed  I have reviewed the following in detail:  [] Stress test   [] Angiography   [] Echocardiogram   [] Labs   [x] Other:       Assessment:         ICD-10-CM ICD-9-CM   1. Coronary artery disease involving coronary bypass graft of native heart with unstable angina pectoris I25.700 414.05     411.1   2. Essential hypertension I10 401.9   3. Pulmonary hypertension I27.20 416.8   4. Coronary artery disease involving coronary bypass graft of native heart with other forms of angina pectoris I25.708 414.05     413.9     Problem List Items Addressed This Visit        Pulmonary    Pulmonary hypertension       Cardiac/Vascular    CAD (coronary artery disease)    Essential hypertension    Coronary artery disease involving coronary bypass graft of native heart with unstable angina pectoris - Primary           Plan:     EKG SR, POOR R PROGRESSION, NO ACUTE ST CHANGES, DISCUSSED OPTIONS AT LENGTH WITH THE PATIENT AND HER CAREGIVER/FRIEND, DECLINES CATH, IN VIEW OF COMPLICATION HAPPEN TO HER AT [facility] POST LAST ANGIOGRAM, WANTS MEDICAL TREATMENT, WILL INCREASE NITRODUR TO 0.2, ADD NORVASC 4 BLOOD PRESSURE WAS HIGH A TIME AND ALSO FOR ANGINA, START WITH 1/2 PILL, FOR 2 WEEKS OR SO,ALL OTHER CV CLINICALLY STABLE,NO HF, NO TIA, NO APPARENT CLINICAL ARRHYTHMIA,CONTINUE CURRENT MEDS, EDUCATION, DIET, EXERCISE , RETURN TO CLINIC IN 2 MONTHS      Coronary artery disease involving coronary bypass graft of native heart with unstable angina pectoris    Essential hypertension    Pulmonary  hypertension    Coronary artery disease involving coronary bypass graft of native heart with other forms of angina pectoris  -     SCHEDULED EKG 12-LEAD (to Muse)    Other orders  -     nitroGLYCERIN 0.2 mg/hr TD PT24 (NITRODUR) 0.2 mg/hr; Place 1 patch onto the skin once daily.  Dispense: 30 patch; Refill: 2  -     amLODIPine (NORVASC) 2.5 MG tablet; Take 1 tablet (2.5 mg total) by mouth every evening.  Dispense: 30 tablet; Refill: 2    RTC Low level/low impact aerobic exercise 5x's/wk. Heart healthy diet and risk factor modification.    See labs and med orders.    Aerobic exercise 5x's/wk. Heart healthy diet and risk factor modification.    See labs and med orders.

## 2019-12-19 DIAGNOSIS — I25.708 CORONARY ARTERY DISEASE INVOLVING CORONARY BYPASS GRAFT OF NATIVE HEART WITH OTHER FORMS OF ANGINA PECTORIS: Primary | ICD-10-CM

## 2019-12-21 VITALS
HEIGHT: 66 IN | SYSTOLIC BLOOD PRESSURE: 138 MMHG | OXYGEN SATURATION: 94 % | WEIGHT: 136.44 LBS | HEART RATE: 74 BPM | DIASTOLIC BLOOD PRESSURE: 66 MMHG | BODY MASS INDEX: 21.93 KG/M2

## 2020-01-11 ENCOUNTER — CLINICAL SUPPORT (OUTPATIENT)
Dept: CARDIOLOGY | Facility: CLINIC | Age: 85
End: 2020-01-11
Payer: MEDICARE

## 2020-01-11 PROCEDURE — 93298 CARDIAC DEVICE CHECK - REMOTE: ICD-10-PCS | Mod: ,,, | Performed by: INTERNAL MEDICINE

## 2020-01-11 PROCEDURE — 93298 REM INTERROG DEV EVAL SCRMS: CPT | Mod: ,,, | Performed by: INTERNAL MEDICINE

## 2020-01-11 PROCEDURE — G2066 INTER DEVC REMOTE 30D: HCPCS | Mod: PBBFAC,PO | Performed by: INTERNAL MEDICINE

## 2020-01-15 ENCOUNTER — OFFICE VISIT (OUTPATIENT)
Dept: CARDIOLOGY | Facility: CLINIC | Age: 85
End: 2020-01-15
Payer: MEDICARE

## 2020-01-15 VITALS
SYSTOLIC BLOOD PRESSURE: 138 MMHG | HEIGHT: 66 IN | DIASTOLIC BLOOD PRESSURE: 79 MMHG | HEART RATE: 60 BPM | OXYGEN SATURATION: 96 % | WEIGHT: 132.69 LBS | BODY MASS INDEX: 21.33 KG/M2

## 2020-01-15 DIAGNOSIS — I25.708 CORONARY ARTERY DISEASE OF BYPASS GRAFT OF NATIVE HEART WITH STABLE ANGINA PECTORIS: Primary | ICD-10-CM

## 2020-01-15 DIAGNOSIS — I10 ESSENTIAL HYPERTENSION: ICD-10-CM

## 2020-01-15 PROCEDURE — 1125F PR PAIN SEVERITY QUANTIFIED, PAIN PRESENT: ICD-10-PCS | Mod: S$GLB,,, | Performed by: INTERNAL MEDICINE

## 2020-01-15 PROCEDURE — 1159F PR MEDICATION LIST DOCUMENTED IN MEDICAL RECORD: ICD-10-PCS | Mod: S$GLB,,, | Performed by: INTERNAL MEDICINE

## 2020-01-15 PROCEDURE — 99213 PR OFFICE/OUTPT VISIT, EST, LEVL III, 20-29 MIN: ICD-10-PCS | Mod: S$GLB,,, | Performed by: INTERNAL MEDICINE

## 2020-01-15 PROCEDURE — 99213 OFFICE O/P EST LOW 20 MIN: CPT | Mod: S$GLB,,, | Performed by: INTERNAL MEDICINE

## 2020-01-15 PROCEDURE — 1125F AMNT PAIN NOTED PAIN PRSNT: CPT | Mod: S$GLB,,, | Performed by: INTERNAL MEDICINE

## 2020-01-15 PROCEDURE — 1159F MED LIST DOCD IN RCRD: CPT | Mod: S$GLB,,, | Performed by: INTERNAL MEDICINE

## 2020-01-15 RX ORDER — NITROGLYCERIN 40 MG/1
1 PATCH TRANSDERMAL DAILY
Qty: 30 PATCH | Refills: 2 | Status: SHIPPED | OUTPATIENT
Start: 2020-01-15 | End: 2021-01-14

## 2020-01-15 NOTE — PROGRESS NOTES
Subjective:    Patient ID:  Jodie Alvarez is a 87 y.o. female who presents for Coronary Artery Disease; Carotid Artery Disease; and Valvular Heart Disease        HPI  IN PT FOR SPINE /HIP PAIN PER DR RESENDIZ, ANGINA BETTER, NO PND NO ORTHOPNEA NO NEAR-SYNCOPE, SEE ROS    Past Medical History:   Diagnosis Date    Anticoagulant long-term use     Arthritis     Carotid artery occlusion     Cataract     Coronary artery disease     Hyperlipidemia     Hypertension      Past Surgical History:   Procedure Laterality Date    ANGIOPLASTY      DR SEGUNDO    APPENDECTOMY  1949    CARDIAC CATHETERIZATION      Carotid Surgery Left 1982    CATARACT EXTRACTION      COLONOSCOPY      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    Dr Low    CORONARY ARTERY BYPASS GRAFT  05/01/2006    HYSTERECTOMY  1972    complete ( age 39)    INSERTION OF IMPLANTABLE LOOP RECORDER N/A 11/12/2019    Procedure: Insertion, Implantable Loop Recorder;  Surgeon: Karlo Pedraza MD;  Location: [facility];  Service: Cardiology;  Laterality: N/A;    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    OOPHORECTOMY  1972    w/Hysterectomy    ORIF HUMERUS FRACTURE Right 8/21/2018    Procedure: ORIF, FRACTURE, HUMERUS - NONUNION;  Surgeon: Donato Resendiz MD;  Location: [facility];  Service: Orthopedics;  Laterality: Right;    Pica Stits  08/14/2008    3    RK/AK Bilateral 1990    Dr Tubbs    stent aearea  04/26/2016    TONSILLECTOMY  1939    VASCULAR SURGERY       Family History   Problem Relation Age of Onset    Amblyopia Neg Hx     Blindness Neg Hx     Cataracts Neg Hx     Glaucoma Neg Hx     Macular degeneration Neg Hx     Retinal detachment Neg Hx     Strabismus Neg Hx      Social History     Socioeconomic History    Marital status:      Spouse name: Not on file    Number of children: Not on file    Years of education: Not on file    Highest education level: Not on file   Occupational History    Not on file   Social Needs    Financial resource strain:  Not on file    Food insecurity:     Worry: Not on file     Inability: Not on file    Transportation needs:     Medical: Not on file     Non-medical: Not on file   Tobacco Use    Smoking status: Never Smoker    Smokeless tobacco: Never Used   Substance and Sexual Activity    Alcohol use: No    Drug use: Never    Sexual activity: Not on file   Lifestyle    Physical activity:     Days per week: Not on file     Minutes per session: Not on file    Stress: Not on file   Relationships    Social connections:     Talks on phone: Not on file     Gets together: Not on file     Attends Denominational service: Not on file     Active member of club or organization: Not on file     Attends meetings of clubs or organizations: Not on file     Relationship status: Not on file   Other Topics Concern    Not on file   Social History Narrative    Not on file       Review of patient's allergies indicates:  No Known Allergies    Current Outpatient Medications:     amLODIPine (NORVASC) 2.5 MG tablet, Take 1 tablet (2.5 mg total) by mouth every evening., Disp: 30 tablet, Rfl: 2    aspirin 325 MG tablet, Take 325 mg by mouth once daily. , Disp: , Rfl:     atorvastatin (LIPITOR) 80 MG tablet, Take 80 mg by mouth nightly., Disp: , Rfl: 3    bumetanide (BUMEX) 1 MG tablet, Take 1 tablet (1 mg total) by mouth once daily., Disp: 30 tablet, Rfl: 11    calcium carbonate-vitamin D3 (CALTRATE WITH VITAMIN D3) 600 mg(1,500mg) -800 unit Tab, Take 1 tablet by mouth once daily., Disp: , Rfl:     clopidogrel (PLAVIX) 75 mg tablet, Take 75 mg by mouth once daily., Disp: , Rfl: 3    clorazepate (TRANXENE) 7.5 MG Tab, Take 7.5 mg by mouth 3 (three) times daily., Disp: , Rfl:     coenzyme Q10 (CO Q-10) 100 mg capsule, Take 100 mg by mouth every evening., Disp: , Rfl:     DUREZOL 0.05 % Drop ophthalmic solution, PLACE TWO DROPS INTO LEFT EYE TWICE DAILY, Disp: 5 mL, Rfl: 3    FLUARIX QUAD [id number], PF, 60 mcg (15 mcg x 4)/0.5 mL Syrg, to be  administered by pharmacist for immunization, Disp: , Rfl: 0    HYDROcodone-acetaminophen (NORCO) 5-325 mg per tablet, Take 1 tablet by mouth every 6 (six) hours as needed. , Disp: , Rfl: 0    metoprolol tartrate (LOPRESSOR) 100 MG tablet, Take 1 tablet (100 mg total) by mouth 2 (two) times daily., Disp: 60 tablet, Rfl: 4    nitroGLYCERIN (NITROSTAT) 0.4 MG SL tablet, Place 1 tablet (0.4 mg total) under the tongue every 5 (five) minutes as needed., Disp: 25 tablet, Rfl: 0    nitroGLYCERIN 0.2 mg/hr TD PT24 (NITRODUR) 0.2 mg/hr, Place 1 patch onto the skin once daily., Disp: 30 patch, Rfl: 2    pantoprazole (PROTONIX) 40 MG tablet, Take 40 mg by mouth once daily. , Disp: , Rfl: 3    potassium chloride (MICRO-K) 10 MEQ CpSR, Take 10 mEq by mouth once daily., Disp: , Rfl: 0    ranolazine (RANEXA) 1,000 mg Tb12, Take 1 tablet (1,000 mg total) by mouth 2 (two) times daily., Disp: 180 tablet, Rfl: 1    VASCEPA 1 gram Cap, Take 1 tablet by mouth 2 (two) times daily., Disp: , Rfl: 0    amiodarone (PACERONE) 100 MG Tab, Take 1 tablet (100 mg total) by mouth every other day., Disp: 15 tablet, Rfl: 1    clobetasol 0.05% (TEMOVATE) 0.05 % Oint, APPLY TO THE AFFECTED AREA(S) LEGS TWICE DAILY wear knee high support hose, Disp: , Rfl: 2  No current facility-administered medications for this visit.     Facility-Administered Medications Ordered in Other Visits:     sodium chloride 0.9% flush 3 mL, 3 mL, Intravenous, PRN, Donato Awan MD    Review of Systems   Constitution: Negative for chills, diaphoresis, fever, malaise/fatigue (SOME) and night sweats.   HENT: Negative for congestion and nosebleeds.    Eyes: Negative for blurred vision and visual disturbance.   Cardiovascular: Negative for chest pain (DECREASED ANGINA), claudication, cyanosis, dyspnea on exertion (MILD/ MOD), irregular heartbeat, leg swelling (SOME), near-syncope, orthopnea, palpitations (LESS), paroxysmal nocturnal dyspnea and syncope.   Respiratory:  "Negative for cough, hemoptysis, shortness of breath (SOME) and wheezing.    Endocrine: Negative for cold intolerance, heat intolerance and polyuria.   Hematologic/Lymphatic: Negative for adenopathy. Does not bruise/bleed easily.   Skin: Negative for color change, itching and rash.   Musculoskeletal: Negative for back pain (SOME) and falls. Joint pain: R HIP.   Gastrointestinal: Negative for abdominal pain, change in bowel habit, dysphagia, heartburn, hematemesis, jaundice, melena and vomiting.   Genitourinary: Negative for dysuria, flank pain and frequency. Bladder incontinence: SOME.   Neurological: Negative for brief paralysis, dizziness (OCC), focal weakness, light-headedness, loss of balance, sensory change, tremors and weakness.   Psychiatric/Behavioral: Negative for altered mental status, depression and memory loss. The patient is not nervous/anxious.    Allergic/Immunologic: Negative for hives and persistent infections.        Objective:      Vitals:    01/15/20 1420   BP: 138/79   Pulse: 60   SpO2: 96%   Weight: 60.2 kg (132 lb 11.5 oz)   Height: 5' 6" (1.676 m)   PainSc:   6     Body mass index is 21.42 kg/m².    Physical Exam   Constitutional: She is oriented to person, place, and time. She appears well-developed and well-nourished. She is active.   HENT:   Head: Normocephalic and atraumatic.   Mouth/Throat: Oropharynx is clear and moist and mucous membranes are normal.   Eyes: Pupils are equal, round, and reactive to light. Conjunctivae and EOM are normal.   Neck: Trachea normal and normal range of motion. Neck supple. Normal carotid pulses, no hepatojugular reflux and no JVD present. Carotid bruit is present. No edema and no erythema present. No thyromegaly present.       R CEA SACR   Cardiovascular: Normal rate and regular rhythm.  No extrasystoles are present. PMI is not displaced. Exam reveals no gallop and no friction rub.   Murmur heard.  High-pitched blowing systolic murmur is present with a grade " of 2/6 at the apex.  Pulses:       Carotid pulses are 2+ on the right side with bruit, and 2+ on the left side with bruit.       Radial pulses are 2+ on the right side, and 2+ on the left side.        Femoral pulses are 2+ on the right side with bruit, and 2+ on the left side with bruit.       Dorsalis pedis pulses are 2+ on the right side, and 2+ on the left side.        Posterior tibial pulses are 2+ on the right side, and 2+ on the left side.   Pulmonary/Chest: Effort normal and breath sounds normal. No tachypnea and no bradypnea. She has no wheezes. She has no rales. She exhibits no tenderness.   STERNOTOMY SCAR   Abdominal: Soft. Bowel sounds are normal. She exhibits no distension and no mass. There is no hepatosplenomegaly. There is no CVA tenderness.   Musculoskeletal: Normal range of motion. She exhibits no edema.   TRACE TO +1 EDEMA   Lymphadenopathy:     She has no cervical adenopathy.   Neurological: She is alert and oriented to person, place, and time. She has normal strength. She displays no tremor. No cranial nerve deficit.   Skin: Skin is warm and dry. No cyanosis or erythema.   Psychiatric: She has a normal mood and affect. Her speech is normal and behavior is normal.               ..    Chemistry        Component Value Date/Time     06/06/2019 1000    K 4.5 06/06/2019 1000     06/06/2019 1000    CO2 29 06/06/2019 1000    BUN 19 06/06/2019 1000    CREATININE 1.5 (H) 06/06/2019 1000    GLU 88 06/06/2019 1000        Component Value Date/Time    CALCIUM 9.8 06/06/2019 1000    ALKPHOS 71 06/06/2019 1000    AST 31 06/06/2019 1000    ALT 19 06/06/2019 1000    BILITOT 0.4 06/06/2019 1000    ESTGFRAFRICA 36.1 (A) 06/06/2019 1000    EGFRNONAA 31.3 (A) 06/06/2019 1000            ..  Lab Results   Component Value Date    CHOL 121 06/06/2019     Lab Results   Component Value Date    HDL 50 06/06/2019     Lab Results   Component Value Date    LDLCALC 49.4 (L) 06/06/2019     Lab Results   Component  Value Date    TRIG 108 06/06/2019     Lab Results   Component Value Date    CHOLHDL 41.3 06/06/2019     ..  Lab Results   Component Value Date    WBC 6.05 08/22/2018    HGB 10.6 (L) 06/06/2019    HCT 32.2 (L) 08/22/2018    MCV 91 08/22/2018     08/22/2018       Test(s) Reviewed  I have reviewed the following in detail:  [] Stress test   [] Angiography   [] Echocardiogram   [] Labs   [x] Other:       Assessment:         ICD-10-CM ICD-9-CM   1. 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 Essential hypertension I10 401.9     Problem List Items Addressed This Visit        Cardiac/Vascular    Essential hypertension    Relevant Orders    Comprehensive metabolic panel    Coronary artery disease of bypass graft of native heart with stable angina pectoris - Primary    Relevant Orders    Comprehensive metabolic panel           Plan:     STABLE NOW,AVOID STRAINING,ALL OTHER CV CLINICALLY STABLE, CLASS 1 TO 2 ANGINA, NO HF, NO TIA, NO CLINICAL ARRHYTHMIA,CONTINUE CURRENT MEDS, EDUCATION, DIET, EXERCISE, DISCUSSED PLAN WITH THE PATIENT AND HER FRIEND,  RTC IN 4 MO WITH LABS      Coronary artery disease of bypass graft of native heart with stable angina pectoris  -     Cancel: Comprehensive metabolic panel; Future; Expected date: 01/15/2020  -     Comprehensive metabolic panel; Future; Expected date: 01/15/2020    Essential hypertension  -     Cancel: Comprehensive metabolic panel; Future; Expected date: 01/15/2020  -     Comprehensive metabolic panel; Future; Expected date: 01/15/2020    Other orders  -     nitroGLYCERIN 0.2 mg/hr TD PT24 (NITRODUR) 0.2 mg/hr; Place 1 patch onto the skin once daily.  Dispense: 30 patch; Refill: 2    RTC Low level/low impact aerobic exercise 5x's/wk. Heart healthy diet and risk factor modification.    See labs and med orders.    Aerobic exercise 5x's/wk. Heart healthy diet and risk factor modification.    See labs and  med orders.

## 2020-01-16 DIAGNOSIS — I35.1 NONRHEUMATIC AORTIC VALVE INSUFFICIENCY: Primary | ICD-10-CM

## 2020-01-16 RX ORDER — AMIODARONE HYDROCHLORIDE 100 MG/1
100 TABLET ORAL EVERY OTHER DAY
Qty: 15 TABLET | Refills: 1 | Status: SHIPPED | OUTPATIENT
Start: 2020-01-16 | End: 2020-05-27 | Stop reason: SDUPTHER

## 2020-01-24 RX ORDER — DUREZOL 0.5 MG/ML
EMULSION OPHTHALMIC
Qty: 5 ML | Refills: 3 | Status: SHIPPED | OUTPATIENT
Start: 2020-01-24

## 2020-02-10 ENCOUNTER — CLINICAL SUPPORT (OUTPATIENT)
Dept: CARDIOLOGY | Facility: CLINIC | Age: 85
End: 2020-02-10
Payer: MEDICARE

## 2020-02-10 PROCEDURE — G2066 INTER DEVC REMOTE 30D: HCPCS | Mod: PBBFAC,PO | Performed by: INTERNAL MEDICINE

## 2020-02-10 PROCEDURE — 93298 REM INTERROG DEV EVAL SCRMS: CPT | Mod: ,,, | Performed by: INTERNAL MEDICINE

## 2020-02-10 PROCEDURE — 93298 CARDIAC DEVICE CHECK - REMOTE: ICD-10-PCS | Mod: ,,, | Performed by: INTERNAL MEDICINE

## 2020-02-21 ENCOUNTER — TELEPHONE (OUTPATIENT)
Dept: CARDIOLOGY | Facility: CLINIC | Age: 85
End: 2020-02-21

## 2020-02-21 NOTE — TELEPHONE ENCOUNTER
Reports from [employer] home monitoring.  Transmission sent via [employer], from [facility].  Pt had AF on 2/12/2020 for approx. 2 hours and 2/13/2020 for approx.  20+ hours, with heart rates between 97-167bpm.  Several tachy/AF events on 2/12/2020 are not viewable.  All tachy events are AF episodes.       Couldn't reach son, Malcolm.  Spoke with a friend of family, Ana, she states pt passed out 2/5/2020 per what pt told her.  She also passed out and hit her head at [employer] on 2/9/2020 went to hospital and air lifted to [facility].  Pt had brain bleed.  Admitted into [facility] facility in [place] on 2/19/2020.  She had a fainting spell at facility and was admitted into [facility] on 2/20/2020. Ana also states that pt's son, is hard to reach.  If needed contact her or another good friend of family that helps pt with doctors apts and other needs, Kiara [phone].    See attached documents to review AF RVR episode.

## 2020-03-11 ENCOUNTER — CLINICAL SUPPORT (OUTPATIENT)
Dept: CARDIOLOGY | Facility: CLINIC | Age: 85
End: 2020-03-11
Payer: MEDICARE

## 2020-03-11 PROCEDURE — 93298 CARDIAC DEVICE CHECK - REMOTE: ICD-10-PCS | Mod: ,,, | Performed by: INTERNAL MEDICINE

## 2020-03-11 PROCEDURE — G2066 INTER DEVC REMOTE 30D: HCPCS | Mod: PBBFAC,PO | Performed by: INTERNAL MEDICINE

## 2020-03-11 PROCEDURE — 93298 REM INTERROG DEV EVAL SCRMS: CPT | Mod: ,,, | Performed by: INTERNAL MEDICINE

## 2020-04-07 DIAGNOSIS — I10 ESSENTIAL HYPERTENSION: Primary | ICD-10-CM

## 2020-04-07 RX ORDER — AMLODIPINE BESYLATE 2.5 MG/1
2.5 TABLET ORAL NIGHTLY
Qty: 30 TABLET | Refills: 2 | Status: SHIPPED | OUTPATIENT
Start: 2020-04-07 | End: 2021-04-07

## 2020-04-10 ENCOUNTER — CLINICAL SUPPORT (OUTPATIENT)
Dept: CARDIOLOGY | Facility: CLINIC | Age: 85
End: 2020-04-10
Payer: MEDICARE

## 2020-04-10 PROCEDURE — 93298 REM INTERROG DEV EVAL SCRMS: CPT | Mod: ,,, | Performed by: INTERNAL MEDICINE

## 2020-04-10 PROCEDURE — G2066 INTER DEVC REMOTE 30D: HCPCS | Mod: PBBFAC,PO | Performed by: INTERNAL MEDICINE

## 2020-04-10 PROCEDURE — 93298 CARDIAC DEVICE CHECK - REMOTE: ICD-10-PCS | Mod: ,,, | Performed by: INTERNAL MEDICINE

## 2020-04-23 DIAGNOSIS — I27.20 PULMONARY HYPERTENSION: ICD-10-CM

## 2020-04-23 RX ORDER — RANOLAZINE 1000 MG/1
1000 TABLET, EXTENDED RELEASE ORAL 2 TIMES DAILY
Qty: 180 TABLET | Refills: 0 | Status: SHIPPED | OUTPATIENT
Start: 2020-04-23

## 2020-05-10 ENCOUNTER — CLINICAL SUPPORT (OUTPATIENT)
Dept: CARDIOLOGY | Facility: CLINIC | Age: 85
End: 2020-05-10
Payer: MEDICARE

## 2020-05-10 PROCEDURE — 93298 CARDIAC DEVICE CHECK - REMOTE: ICD-10-PCS | Mod: ,,, | Performed by: INTERNAL MEDICINE

## 2020-05-10 PROCEDURE — 93298 REM INTERROG DEV EVAL SCRMS: CPT | Mod: ,,, | Performed by: INTERNAL MEDICINE

## 2020-05-18 ENCOUNTER — TELEPHONE (OUTPATIENT)
Dept: CARDIOLOGY | Facility: CLINIC | Age: 85
End: 2020-05-18

## 2020-05-18 NOTE — TELEPHONE ENCOUNTER
----- Message from Shanita Ahn sent at 5/18/2020 10:50 AM CDT -----  Contact: self  Type: Needs Medical Advice  Who Called:  self  Symptoms (please be specific):    How long has patient had these symptoms:    Pharmacy name and phone #:    Best Call Back Number: [phone] (home)   Additional Information: Patient has an appointment on 05/20/20 and needs to know if she needs blood work. Please call patient to advise. thanks!

## 2020-05-20 ENCOUNTER — OFFICE VISIT (OUTPATIENT)
Dept: CARDIOLOGY | Facility: CLINIC | Age: 85
End: 2020-05-20
Payer: MEDICARE

## 2020-05-20 VITALS
BODY MASS INDEX: 21.33 KG/M2 | WEIGHT: 132.69 LBS | DIASTOLIC BLOOD PRESSURE: 78 MMHG | OXYGEN SATURATION: 95 % | SYSTOLIC BLOOD PRESSURE: 138 MMHG | HEART RATE: 74 BPM | HEIGHT: 66 IN

## 2020-05-20 DIAGNOSIS — Z79.02 LONG TERM (CURRENT) USE OF ANTITHROMBOTICS/ANTIPLATELETS: ICD-10-CM

## 2020-05-20 DIAGNOSIS — E78.5 DYSLIPIDEMIA: ICD-10-CM

## 2020-05-20 DIAGNOSIS — I25.708 CORONARY ARTERY DISEASE OF BYPASS GRAFT OF NATIVE HEART WITH STABLE ANGINA PECTORIS: Primary | ICD-10-CM

## 2020-05-20 DIAGNOSIS — I10 ESSENTIAL HYPERTENSION: ICD-10-CM

## 2020-05-20 PROCEDURE — 99214 PR OFFICE/OUTPT VISIT, EST, LEVL IV, 30-39 MIN: ICD-10-PCS | Mod: S$GLB,,, | Performed by: INTERNAL MEDICINE

## 2020-05-20 PROCEDURE — 99214 OFFICE O/P EST MOD 30 MIN: CPT | Mod: S$GLB,,, | Performed by: INTERNAL MEDICINE

## 2020-05-20 RX ORDER — ATORVASTATIN CALCIUM 80 MG/1
80 TABLET, FILM COATED ORAL NIGHTLY
Qty: 90 TABLET | Refills: 0 | Status: SHIPPED | OUTPATIENT
Start: 2020-05-20

## 2020-05-20 NOTE — PROGRESS NOTES
Subjective:    Patient ID:  Jodie Alvarez is a 87 y.o. female who presents for Coronary Artery Disease; Hypertension; and Loss of Consciousness        HPI  HAD SYNCOPE, MILD INTRACEREBRAL BLEED, WAS AT [facility], BILATERAL SUB DURAL HEMATOMA, HAD SURGERY, THEN REHAB,?? SEIZURE,THEN [facility], WAS TOLD CVA BY CT SCAN,  ?? 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    Dr Low    CORONARY ARTERY BYPASS GRAFT  05/01/2006    HYSTERECTOMY  1972    complete ( age 39)    INSERTION OF IMPLANTABLE LOOP RECORDER N/A 11/12/2019    Procedure: Insertion, Implantable Loop Recorder;  Surgeon: Karlo Pedraza MD;  Location: [facility] CATH;  Service: Cardiology;  Laterality: N/A;    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    OOPHORECTOMY  1972    w/Hysterectomy    ORIF HUMERUS FRACTURE Right 8/21/2018    Procedure: ORIF, FRACTURE, HUMERUS - NONUNION;  Surgeon: Donato Awan MD;  Location: [facility] OR;  Service: Orthopedics;  Laterality: Right;    Pica Stits  08/14/2008    3    RK/AK Bilateral 1990    Dr Tubbs    stent aearea  04/26/2016    TONSILLECTOMY  1939    VASCULAR SURGERY       Family History   Problem Relation Age of Onset    Amblyopia Neg Hx     Blindness Neg Hx     Cataracts Neg Hx     Glaucoma Neg Hx     Macular degeneration Neg Hx     Retinal detachment Neg Hx     Strabismus Neg Hx      Social History     Socioeconomic History    Marital status:      Spouse name: Not on file    Number of children: Not on file    Years of education: Not on file    Highest  education level: Not on file   Occupational History    Not on file   Social Needs    Financial resource strain: Not on file    Food insecurity:     Worry: Not on file     Inability: Not on file    Transportation needs:     Medical: Not on file     Non-medical: Not on file   Tobacco Use    Smoking status: Never Smoker    Smokeless tobacco: Never Used   Substance and Sexual Activity    Alcohol use: No    Drug use: Never    Sexual activity: Not on file   Lifestyle    Physical activity:     Days per week: Not on file     Minutes per session: Not on file    Stress: Not on file   Relationships    Social connections:     Talks on phone: Not on file     Gets together: Not on file     Attends [employer] service: Not on file     Active member of club or organization: Not on file     Attends meetings of clubs or organizations: Not on file     Relationship status: Not on file   Other Topics Concern    Not on file   Social History Narrative    Not on file       Review of patient's allergies indicates:  No Known Allergies    Current Outpatient Medications:     amiodarone (PACERONE) 100 MG Tab, Take 1 tablet (100 mg total) by mouth every other day., Disp: 15 tablet, Rfl: 1    aspirin 325 MG tablet, Take 325 mg by mouth once daily. , Disp: , Rfl:     atorvastatin (LIPITOR) 80 MG tablet, Take 1 tablet (80 mg total) by mouth nightly., Disp: 90 tablet, Rfl: 0    bumetanide (BUMEX) 1 MG tablet, Take 1 tablet (1 mg total) by mouth once daily., Disp: 30 tablet, Rfl: 11    calcium carbonate-vitamin D3 (CALTRATE WITH VITAMIN D3) 600 mg(1,500mg) -800 unit Tab, Take 1 tablet by mouth once daily., Disp: , Rfl:     clopidogrel (PLAVIX) 75 mg tablet, Take 75 mg by mouth once daily., Disp: , Rfl: 3    clorazepate (TRANXENE) 7.5 MG Tab, Take 7.5 mg by mouth 3 (three) times daily., Disp: , Rfl:     coenzyme Q10 (CO Q-10) 100 mg capsule, Take 100 mg by mouth every evening., Disp: , Rfl:     DUREZOL 0.05 % Drop ophthalmic  solution, PLACE TWO DROPS INTO LEFT EYE TWICE DAILY, Disp: 5 mL, Rfl: 3    FLUARIX QUAD 7507-9371, PF, 60 mcg (15 mcg x 4)/0.5 mL Syrg, to be administered by pharmacist for immunization, Disp: , Rfl: 0    HYDROcodone-acetaminophen (NORCO) 5-325 mg per tablet, Take 1 tablet by mouth every 6 (six) hours as needed. , Disp: , Rfl: 0    metoprolol tartrate (LOPRESSOR) 100 MG tablet, Take 1 tablet (100 mg total) by mouth 2 (two) times daily. (Patient taking differently: Take 100 mg by mouth 2 (two) times daily. TAKES ONE TABLET IN AM AND 1/2 TABLET AT NIGHT), Disp: 60 tablet, Rfl: 4    nitroGLYCERIN 0.2 mg/hr TD PT24 (NITRODUR) 0.2 mg/hr, Place 1 patch onto the skin once daily., Disp: 30 patch, Rfl: 2    pantoprazole (PROTONIX) 40 MG tablet, Take 40 mg by mouth once daily. , Disp: , Rfl: 3    potassium chloride (MICRO-K) 10 MEQ CpSR, Take 10 mEq by mouth once daily., Disp: , Rfl: 0    ranolazine (RANEXA) 1,000 mg Tb12, Take 1 tablet (1,000 mg total) by mouth 2 (two) times daily., Disp: 180 tablet, Rfl: 0    VASCEPA 1 gram Cap, Take 1 tablet by mouth 2 (two) times daily., Disp: , Rfl: 0    amLODIPine (NORVASC) 2.5 MG tablet, Take 1 tablet (2.5 mg total) by mouth every evening. (Patient not taking: Reported on 5/20/2020), Disp: 30 tablet, Rfl: 2    clobetasol 0.05% (TEMOVATE) 0.05 % Oint, APPLY TO THE AFFECTED AREA(S) LEGS TWICE DAILY wear knee high support hose, Disp: , Rfl: 2  No current facility-administered medications for this visit.     Facility-Administered Medications Ordered in Other Visits:     sodium chloride 0.9% flush 3 mL, 3 mL, Intravenous, PRN, Donato Awan MD    Review of Systems   Constitution: Negative for chills, diaphoresis, fever, malaise/fatigue (SOME) and night sweats.   HENT: Negative for congestion and nosebleeds.    Eyes: Negative for blurred vision and visual disturbance.   Cardiovascular: Negative for chest pain, claudication, cyanosis, dyspnea on exertion (MILD), irregular heartbeat,  "leg swelling, near-syncope, orthopnea, palpitations (LESS), paroxysmal nocturnal dyspnea and syncope (no recurrence).   Respiratory: Negative for cough, hemoptysis, shortness of breath (SOME) and wheezing.    Endocrine: Negative for cold intolerance, heat intolerance and polyuria.   Hematologic/Lymphatic: Negative for adenopathy. Does not bruise/bleed easily.   Skin: Negative for color change, itching and rash.   Musculoskeletal: Negative for back pain (SOME) and falls. Joint pain: R HIP.   Gastrointestinal: Negative for abdominal pain, change in bowel habit, dysphagia, heartburn, hematemesis, jaundice, melena and vomiting.   Genitourinary: Negative for dysuria, flank pain and frequency. Bladder incontinence: SOME.   Neurological: Negative for brief paralysis, dizziness, focal weakness, light-headedness, loss of balance, sensory change, tremors and weakness.   Psychiatric/Behavioral: Negative for altered mental status, depression and memory loss. The patient is not nervous/anxious.    Allergic/Immunologic: Negative for hives and persistent infections.        Objective:      Vitals:    05/20/20 1448   BP: 138/78   Pulse: 74   SpO2: 95%   Weight: 60.2 kg (132 lb 11.5 oz)   Height: 5' 6" (1.676 m)   PainSc: 0-No pain     Body mass index is 21.42 kg/m².    Physical Exam   Constitutional: She is oriented to person, place, and time. She appears well-developed and well-nourished. She is active.   HENT:   Head: Normocephalic and atraumatic.   Mouth/Throat: Oropharynx is clear and moist and mucous membranes are normal.   Eyes: Pupils are equal, round, and reactive to light. Conjunctivae and EOM are normal.   Neck: Trachea normal and normal range of motion. Neck supple. Normal carotid pulses, no hepatojugular reflux and no JVD present. Carotid bruit is present. No edema and no erythema present. No thyromegaly present.       R CEA SACR   Cardiovascular: Normal rate and regular rhythm.  No extrasystoles are present. PMI is not " displaced. Exam reveals no gallop and no friction rub.   Murmur heard.  High-pitched blowing systolic murmur is present with a grade of 2/6 at the apex.  Pulses:       Carotid pulses are 2+ on the right side with bruit, and 2+ on the left side with bruit.       Radial pulses are 2+ on the right side, and 2+ on the left side.        Femoral pulses are 2+ on the right side with bruit, and 2+ on the left side with bruit.       Dorsalis pedis pulses are 2+ on the right side, and 2+ on the left side.        Posterior tibial pulses are 2+ on the right side, and 2+ on the left side.   Pulmonary/Chest: Effort normal and breath sounds normal. No tachypnea and no bradypnea. She has no wheezes. She has no rales. She exhibits no tenderness.   STERNOTOMY SCAR   Abdominal: Soft. Bowel sounds are normal. She exhibits no distension and no mass. There is no hepatosplenomegaly. There is no CVA tenderness.   Musculoskeletal: Normal range of motion. She exhibits no edema.   USES A WALKER   Lymphadenopathy:     She has no cervical adenopathy.   Neurological: She is alert and oriented to person, place, and time. She has normal strength. She displays no tremor. No cranial nerve deficit.   Skin: Skin is warm and dry. No cyanosis or erythema.   Psychiatric: She has a normal mood and affect. Her speech is normal and behavior is normal.               ..    Chemistry        Component Value Date/Time     06/06/2019 1000    K 4.5 06/06/2019 1000     06/06/2019 1000    CO2 29 06/06/2019 1000    BUN 19 06/06/2019 1000    CREATININE 1.5 (H) 06/06/2019 1000    GLU 88 06/06/2019 1000        Component Value Date/Time    CALCIUM 9.8 06/06/2019 1000    ALKPHOS 71 06/06/2019 1000    AST 31 06/06/2019 1000    ALT 19 06/06/2019 1000    BILITOT 0.4 06/06/2019 1000    ESTGFRAFRICA 36.1 (A) 06/06/2019 1000    EGFRNONAA 31.3 (A) 06/06/2019 1000            ..  Lab Results   Component Value Date    CHOL 121 06/06/2019     Lab Results   Component  Value Date    HDL 50 06/06/2019     Lab Results   Component Value Date    LDLCALC 49.4 (L) 06/06/2019     Lab Results   Component Value Date    TRIG 108 06/06/2019     Lab Results   Component Value Date    CHOLHDL 41.3 06/06/2019     ..  Lab Results   Component Value Date    WBC 6.05 08/22/2018    HGB 10.6 (L) 06/06/2019    HCT 32.2 (L) 08/22/2018    MCV 91 08/22/2018     08/22/2018       Test(s) Reviewed  I have reviewed the following in detail:  [] Stress test   [] Angiography   [] Echocardiogram   [x] Labs   [x] Other:       Assessment:         ICD-10-CM ICD-9-CM   1. 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 Long term (current) use of antithrombotics/antiplatelets Z79.02 V58.63   3. Essential hypertension I10 401.9   4. Dyslipidemia E78.5 272.4     Problem List Items Addressed This Visit        Cardiac/Vascular    Essential hypertension    Relevant Orders    Comprehensive metabolic panel    Dyslipidemia    Relevant Orders    Comprehensive metabolic panel    Lipid Panel    Coronary artery disease of bypass graft of native heart with stable angina pectoris - Primary    Relevant Orders    Comprehensive metabolic panel       Hematology    Long term (current) use of antithrombotics/antiplatelets    Relevant Orders    Comprehensive metabolic panel    Hemoglobin           Plan:         TRY TO OBTAIN RECORDS FROM [facility] WITH NEUROLOGY SPECIALLY REGARDING SEIZURE AND NEED FOR SEIZURE MEDICATION WHICH SHE WAS ON AND ALSO REGARDING RESUMING DRIVING ,ALL CV CLINICALLY RELATIVELY STABLE, CLASS 1 ANGINA, NO HF, NO TIA, NO CLINICAL ARRHYTHMIA,CONTINUE CURRENT MEDS, EDUCATION, DIET, EXERCISE , STAY ACTIVE, RETURN TO CLINIC IN 4 MO WITH LABS DISCUSSED PLAN WITH THE PATIENT AND HER /SITTER  Coronary artery disease of bypass graft of native heart with stable angina pectoris  -     Comprehensive metabolic panel; Future; Expected date:  05/20/2020    Long term (current) use of antithrombotics/antiplatelets  -     Comprehensive metabolic panel; Future; Expected date: 05/20/2020  -     Hemoglobin; Future; Expected date: 05/20/2020    Essential hypertension  -     Comprehensive metabolic panel; Future; Expected date: 05/20/2020    Dyslipidemia  -     Comprehensive metabolic panel; Future; Expected date: 05/20/2020  -     Lipid Panel; Future; Expected date: 05/20/2020    Other orders  -     atorvastatin (LIPITOR) 80 MG tablet; Take 1 tablet (80 mg total) by mouth nightly.  Dispense: 90 tablet; Refill: 0    RTC Low level/low impact aerobic exercise 5x's/wk. Heart healthy diet and risk factor modification.    See labs and med orders.    Aerobic exercise 5x's/wk. Heart healthy diet and risk factor modification.    See labs and med orders.

## 2020-05-27 ENCOUNTER — TELEPHONE (OUTPATIENT)
Dept: CARDIOLOGY | Facility: CLINIC | Age: 85
End: 2020-05-27

## 2020-05-27 DIAGNOSIS — I35.1 NONRHEUMATIC AORTIC VALVE INSUFFICIENCY: ICD-10-CM

## 2020-05-27 RX ORDER — AMIODARONE HYDROCHLORIDE 100 MG/1
100 TABLET ORAL EVERY OTHER DAY
Qty: 15 TABLET | Refills: 1 | Status: SHIPPED | OUTPATIENT
Start: 2020-05-27 | End: 2021-05-27

## 2020-05-27 NOTE — TELEPHONE ENCOUNTER
----- Message from Danita Cee sent at 5/27/2020  8:43 AM CDT -----  Type: Needs lab orders sent    Who Called:  Patient  Best Call Back Number: [phone]  Additional Information: Patient stated she needs lab orders sent to [facility] in [place]/stated they have not received/please call back to advise.   [Follow-Up: _____] : a [unfilled] follow-up visit [Patient] : patient [Mother] : mother [FreeTextEntry1] : on colchicine

## 2020-06-01 DIAGNOSIS — I10 ESSENTIAL HYPERTENSION: ICD-10-CM

## 2020-06-02 RX ORDER — METOPROLOL TARTRATE 100 MG/1
100 TABLET ORAL 2 TIMES DAILY
Qty: 60 TABLET | Refills: 4 | Status: SHIPPED | OUTPATIENT
Start: 2020-06-02

## 2020-06-09 ENCOUNTER — CLINICAL SUPPORT (OUTPATIENT)
Dept: CARDIOLOGY | Facility: CLINIC | Age: 85
End: 2020-06-09
Payer: MEDICARE

## 2020-06-09 DIAGNOSIS — Z95.818 PRESENCE OF OTHER CARDIAC IMPLANTS AND GRAFTS: ICD-10-CM

## 2020-06-09 PROCEDURE — 93298 REM INTERROG DEV EVAL SCRMS: CPT | Mod: ,,, | Performed by: INTERNAL MEDICINE

## 2020-06-09 PROCEDURE — 93298 CARDIAC DEVICE CHECK - REMOTE: ICD-10-PCS | Mod: ,,, | Performed by: INTERNAL MEDICINE

## 2020-06-12 DIAGNOSIS — I25.708 CORONARY ARTERY DISEASE OF BYPASS GRAFT OF NATIVE HEART WITH STABLE ANGINA PECTORIS: ICD-10-CM

## 2020-06-12 DIAGNOSIS — I27.20 PULMONARY HYPERTENSION: Primary | ICD-10-CM

## 2020-06-12 DIAGNOSIS — I10 ESSENTIAL HYPERTENSION: ICD-10-CM

## 2020-07-09 ENCOUNTER — CLINICAL SUPPORT (OUTPATIENT)
Dept: CARDIOLOGY | Facility: CLINIC | Age: 85
End: 2020-07-09
Payer: MEDICARE

## 2020-07-09 DIAGNOSIS — Z95.818 PRESENCE OF OTHER CARDIAC IMPLANTS AND GRAFTS: ICD-10-CM

## 2020-07-09 PROCEDURE — 93298 CARDIAC DEVICE CHECK - REMOTE: ICD-10-PCS | Mod: ,,, | Performed by: INTERNAL MEDICINE

## 2020-07-09 PROCEDURE — 93298 REM INTERROG DEV EVAL SCRMS: CPT | Mod: ,,, | Performed by: INTERNAL MEDICINE

## 2020-08-08 ENCOUNTER — CLINICAL SUPPORT (OUTPATIENT)
Dept: CARDIOLOGY | Facility: CLINIC | Age: 85
End: 2020-08-08
Payer: MEDICARE

## 2020-08-08 DIAGNOSIS — Z95.818 PRESENCE OF OTHER CARDIAC IMPLANTS AND GRAFTS: ICD-10-CM
